# Patient Record
Sex: FEMALE
[De-identification: names, ages, dates, MRNs, and addresses within clinical notes are randomized per-mention and may not be internally consistent; named-entity substitution may affect disease eponyms.]

---

## 2017-04-14 ENCOUNTER — CHARTING TRANS (OUTPATIENT)
Dept: OTHER | Age: 52
End: 2017-04-14

## 2017-04-14 ENCOUNTER — LAB SERVICES (OUTPATIENT)
Dept: OTHER | Age: 52
End: 2017-04-14

## 2017-04-14 LAB — RAPID STREP GROUP A: NORMAL

## 2018-11-03 VITALS
HEART RATE: 82 BPM | DIASTOLIC BLOOD PRESSURE: 64 MMHG | OXYGEN SATURATION: 99 % | RESPIRATION RATE: 18 BRPM | WEIGHT: 130 LBS | SYSTOLIC BLOOD PRESSURE: 102 MMHG | TEMPERATURE: 98.1 F | BODY MASS INDEX: 26.21 KG/M2 | HEIGHT: 59 IN

## 2020-02-03 ENCOUNTER — TELEPHONE (OUTPATIENT)
Dept: SCHEDULING | Age: 55
End: 2020-02-03

## 2020-02-03 ENCOUNTER — OFFICE VISIT (OUTPATIENT)
Dept: FAMILY MEDICINE CLINIC | Facility: CLINIC | Age: 55
End: 2020-02-03
Payer: COMMERCIAL

## 2020-02-03 VITALS
OXYGEN SATURATION: 97 % | BODY MASS INDEX: 29.03 KG/M2 | WEIGHT: 144 LBS | RESPIRATION RATE: 16 BRPM | SYSTOLIC BLOOD PRESSURE: 122 MMHG | TEMPERATURE: 98 F | HEART RATE: 77 BPM | DIASTOLIC BLOOD PRESSURE: 80 MMHG | HEIGHT: 59 IN

## 2020-02-03 DIAGNOSIS — J01.01 ACUTE RECURRENT MAXILLARY SINUSITIS: Primary | ICD-10-CM

## 2020-02-03 DIAGNOSIS — R68.89 FLU-LIKE SYMPTOMS: ICD-10-CM

## 2020-02-03 LAB
FLUAV + FLUBV RNA SPEC NAA+PROBE: NEGATIVE
FLUAV + FLUBV RNA SPEC NAA+PROBE: NEGATIVE
FLUAV + FLUBV RNA SPEC NAA+PROBE: POSITIVE

## 2020-02-03 PROCEDURE — 87798 DETECT AGENT NOS DNA AMP: CPT | Performed by: FAMILY MEDICINE

## 2020-02-03 PROCEDURE — 87502 INFLUENZA DNA AMP PROBE: CPT | Performed by: FAMILY MEDICINE

## 2020-02-03 PROCEDURE — 99203 OFFICE O/P NEW LOW 30 MIN: CPT | Performed by: FAMILY MEDICINE

## 2020-02-03 RX ORDER — AZITHROMYCIN 250 MG/1
TABLET, FILM COATED ORAL
Qty: 6 TABLET | Refills: 0 | Status: SHIPPED | OUTPATIENT
Start: 2020-02-03 | End: 2021-12-16

## 2020-02-03 RX ORDER — FLUTICASONE PROPIONATE 50 MCG
2 SPRAY, SUSPENSION (ML) NASAL DAILY
Qty: 2 BOTTLE | Refills: 3 | Status: SHIPPED | OUTPATIENT
Start: 2020-02-03 | End: 2021-01-28

## 2020-02-04 ENCOUNTER — TELEPHONE (OUTPATIENT)
Dept: FAMILY MEDICINE CLINIC | Facility: CLINIC | Age: 55
End: 2020-02-04

## 2020-02-04 RX ORDER — OSELTAMIVIR PHOSPHATE 75 MG/1
75 CAPSULE ORAL 2 TIMES DAILY
Qty: 10 CAPSULE | Refills: 0 | Status: SHIPPED | OUTPATIENT
Start: 2020-02-04 | End: 2020-02-09

## 2020-02-04 NOTE — TELEPHONE ENCOUNTER
Patient is influenza positive I sent over Tamiflu advise her to continue the Z-Darvin with the Tamiflu if she is having worsening's GI upset she could discontinue the Z-Darvin only.

## 2020-02-04 NOTE — PROGRESS NOTES
HPI:   Olga Parnell is a 47year old female who presents for upper respiratory symptoms for  2  days. Patient reports sore throat, congestion, low grade fever, dry cough, chest pain from coughing. +flu exposure.      Current Outpatient Medications   Me lesions  EYES:PERRLA, EOMI, normal optic disk,conjunctiva are clear  HEENT: atraumatic, normocephalic,ears and throat are clear, worsening maxillary sinus pressure and tenderness   NECK: supple,no adenopathy,no bruits  LUNGS: clear to auscultation  CARDIO:

## 2020-03-25 ENCOUNTER — OFFICE VISIT (OUTPATIENT)
Dept: FAMILY MEDICINE CLINIC | Facility: CLINIC | Age: 55
End: 2020-03-25
Payer: COMMERCIAL

## 2020-03-25 ENCOUNTER — TELEPHONE (OUTPATIENT)
Dept: FAMILY MEDICINE CLINIC | Facility: CLINIC | Age: 55
End: 2020-03-25

## 2020-03-25 DIAGNOSIS — J01.00 ACUTE MAXILLARY SINUSITIS, RECURRENCE NOT SPECIFIED: ICD-10-CM

## 2020-03-25 DIAGNOSIS — H92.02 OTALGIA OF LEFT EAR: Primary | ICD-10-CM

## 2020-03-25 RX ORDER — FLUTICASONE PROPIONATE 50 MCG
2 SPRAY, SUSPENSION (ML) NASAL DAILY
Qty: 1 BOTTLE | Refills: 0 | Status: SHIPPED | OUTPATIENT
Start: 2020-03-25 | End: 2021-03-20

## 2020-03-25 RX ORDER — DOXYCYCLINE HYCLATE 100 MG/1
100 CAPSULE ORAL 2 TIMES DAILY
Qty: 20 CAPSULE | Refills: 0 | Status: SHIPPED | OUTPATIENT
Start: 2020-03-25 | End: 2020-04-04

## 2020-03-25 NOTE — TELEPHONE ENCOUNTER
Virtual/Telephone Check-In    Kelley Mosquera verbally consents to a Virtual/Telephone Check-In service on 03/25/20. Patient understands and accepts financial responsibility for any deductible, co-insurance and/or co-pays associated with this service.

## 2021-09-14 PROBLEM — M65.332 TRIGGER MIDDLE FINGER OF LEFT HAND: Status: ACTIVE | Noted: 2021-09-14

## 2021-11-09 ENCOUNTER — OFFICE VISIT (OUTPATIENT)
Dept: FAMILY MEDICINE CLINIC | Facility: CLINIC | Age: 56
End: 2021-11-09
Payer: COMMERCIAL

## 2021-11-09 VITALS
OXYGEN SATURATION: 96 % | SYSTOLIC BLOOD PRESSURE: 134 MMHG | TEMPERATURE: 98 F | RESPIRATION RATE: 16 BRPM | HEIGHT: 59 IN | WEIGHT: 144 LBS | DIASTOLIC BLOOD PRESSURE: 82 MMHG | BODY MASS INDEX: 29.03 KG/M2 | HEART RATE: 94 BPM

## 2021-11-09 DIAGNOSIS — R09.82 PND (POST-NASAL DRIP): ICD-10-CM

## 2021-11-09 DIAGNOSIS — J01.90 ACUTE SINUSITIS TREATED WITH ANTIBIOTICS IN THE PAST 60 DAYS: Primary | ICD-10-CM

## 2021-11-09 DIAGNOSIS — R51.9 SINUS HEADACHE: ICD-10-CM

## 2021-11-09 PROCEDURE — 99213 OFFICE O/P EST LOW 20 MIN: CPT | Performed by: NURSE PRACTITIONER

## 2021-11-09 PROCEDURE — 3008F BODY MASS INDEX DOCD: CPT | Performed by: NURSE PRACTITIONER

## 2021-11-09 PROCEDURE — 3079F DIAST BP 80-89 MM HG: CPT | Performed by: NURSE PRACTITIONER

## 2021-11-09 PROCEDURE — 3075F SYST BP GE 130 - 139MM HG: CPT | Performed by: NURSE PRACTITIONER

## 2021-11-09 RX ORDER — CEFDINIR 300 MG/1
300 CAPSULE ORAL 2 TIMES DAILY
Qty: 20 CAPSULE | Refills: 0 | Status: SHIPPED | OUTPATIENT
Start: 2021-11-09 | End: 2021-11-19

## 2021-11-09 NOTE — PROGRESS NOTES
Alvaro Antoine is a 64year old female. Patient presents with:  Sinus Problem: sinus cold for 2 weeks. Patient had COVID test at [address] [place] [address] in [facility]. Patient was given a z-pack for symptoms on day 2 of symptoms.  Symptoms today: occasional cough, nasal co 98.1 °F (36.7 °C) (Skin)   Resp 16   Ht 4' 11\" (1.499 m)   Wt 144 lb (65.3 kg)   SpO2 96%   BMI 29.08 kg/m²   General: WD/WN in no acute distress. Eyes & ears: conjunctiva clear, sclera white; TM’s non-bulging without erythema.    Sinuses maxillary: tend

## 2021-11-09 NOTE — PATIENT INSTRUCTIONS
Acute Bacterial Rhinosinusitis (ABRS)    Acute bacterial rhinosinusitis (ABRS) is an infection of your nasal cavity and sinuses. It’s caused by bacteria. Acute means that you’ve had symptoms for less than 4 weeks, but possibly up to 12 weeks.   Understand swelling and congestion. · Over-the-counter pain medicine. This is to lessen sinus pain and pressure. · Nasal decongestant medicine. Spray or drops may help to lessen congestion. Do not use them for more than a few days. · Salt wash (saline irrigation). instructed. Don't stop taking them, even when you feel better. · Drink plenty of water, hot tea, and other liquids as directed by the healthcare provider. This may help thin nasal mucus. It also may help your sinuses drain fluids.   · Heat may help soothe with your healthcare provider before using these medicines. Never give aspirin to anyone under age 25 who is ill with a fever. It may cause severe liver damage. · Don't smoke. This can make symptoms worse.     Follow-up care  Follow up with your healthcare humidifier, follow the product maker's instructions on how to use it. Clean it on a regular schedule. Drinking plenty of water can help sinuses drain.    Use saltwater rinses  Rinses help keep your sinuses and nose moist. Mix a teaspoon of salt in 8 ou colds and flu. When possible, take more time to rest when you feel something “coming on.”   · Practice correct handwashing. Wash your hands often. ? Wash your hands often with soap and warm water.  Scrub them for as long as it takes you to sing the [employer] mildew. · Drink several glasses of water a day. · Stay away from drinks such as alcohol and coffee. Alcohol can cause sinus pressure and congestion. Coffee can dry out your sinus linings.   · Stay away from all types of smoke, which also dries out sinus l

## 2023-03-28 ENCOUNTER — OFFICE VISIT (OUTPATIENT)
Dept: FAMILY MEDICINE CLINIC | Facility: CLINIC | Age: 58
End: 2023-03-28
Payer: COMMERCIAL

## 2023-03-28 VITALS
WEIGHT: 120 LBS | TEMPERATURE: 99 F | RESPIRATION RATE: 16 BRPM | HEIGHT: 59 IN | OXYGEN SATURATION: 98 % | BODY MASS INDEX: 24.19 KG/M2 | DIASTOLIC BLOOD PRESSURE: 84 MMHG | SYSTOLIC BLOOD PRESSURE: 122 MMHG | HEART RATE: 80 BPM

## 2023-03-28 DIAGNOSIS — J01.00 ACUTE MAXILLARY SINUSITIS, RECURRENCE NOT SPECIFIED: Primary | ICD-10-CM

## 2023-03-28 PROCEDURE — 3079F DIAST BP 80-89 MM HG: CPT | Performed by: NURSE PRACTITIONER

## 2023-03-28 PROCEDURE — 3074F SYST BP LT 130 MM HG: CPT | Performed by: NURSE PRACTITIONER

## 2023-03-28 PROCEDURE — 3008F BODY MASS INDEX DOCD: CPT | Performed by: NURSE PRACTITIONER

## 2023-03-28 PROCEDURE — 99213 OFFICE O/P EST LOW 20 MIN: CPT | Performed by: NURSE PRACTITIONER

## 2023-03-28 RX ORDER — CEFDINIR 300 MG/1
300 CAPSULE ORAL 2 TIMES DAILY
Qty: 20 CAPSULE | Refills: 0 | Status: SHIPPED | OUTPATIENT
Start: 2023-03-28 | End: 2023-04-07

## 2023-04-25 ENCOUNTER — TELEPHONE (OUTPATIENT)
Dept: FAMILY MEDICINE CLINIC | Facility: CLINIC | Age: 58
End: 2023-04-25

## 2023-04-25 NOTE — TELEPHONE ENCOUNTER
Patient was seen once by Dr Deja Peralta back in 2020 at the [facility], and now wants to know if he can continue being seen by him.

## 2023-04-28 ENCOUNTER — OFFICE VISIT (OUTPATIENT)
Dept: FAMILY MEDICINE CLINIC | Facility: CLINIC | Age: 58
End: 2023-04-28
Payer: COMMERCIAL

## 2023-04-28 VITALS
RESPIRATION RATE: 16 BRPM | OXYGEN SATURATION: 100 % | SYSTOLIC BLOOD PRESSURE: 120 MMHG | BODY MASS INDEX: 23.75 KG/M2 | WEIGHT: 121 LBS | HEART RATE: 85 BPM | HEIGHT: 60 IN | DIASTOLIC BLOOD PRESSURE: 80 MMHG

## 2023-04-28 DIAGNOSIS — Z12.31 SCREENING MAMMOGRAM FOR BREAST CANCER: ICD-10-CM

## 2023-04-28 DIAGNOSIS — M25.511 CHRONIC RIGHT SHOULDER PAIN: ICD-10-CM

## 2023-04-28 DIAGNOSIS — M77.8 TENDINITIS OF RIGHT SHOULDER: ICD-10-CM

## 2023-04-28 DIAGNOSIS — Z00.00 ROUTINE GENERAL MEDICAL EXAMINATION AT HEALTH CARE FACILITY: Primary | ICD-10-CM

## 2023-04-28 DIAGNOSIS — G89.29 CHRONIC RIGHT SHOULDER PAIN: ICD-10-CM

## 2023-04-28 DIAGNOSIS — Z80.0 FAMILY HX OF COLON CANCER: ICD-10-CM

## 2023-04-28 PROBLEM — M65.332 TRIGGER MIDDLE FINGER OF LEFT HAND: Status: RESOLVED | Noted: 2021-09-14 | Resolved: 2023-04-28

## 2023-04-28 PROCEDURE — 3079F DIAST BP 80-89 MM HG: CPT | Performed by: FAMILY MEDICINE

## 2023-04-28 PROCEDURE — 99386 PREV VISIT NEW AGE 40-64: CPT | Performed by: FAMILY MEDICINE

## 2023-04-28 PROCEDURE — 3074F SYST BP LT 130 MM HG: CPT | Performed by: FAMILY MEDICINE

## 2023-04-28 PROCEDURE — 3008F BODY MASS INDEX DOCD: CPT | Performed by: FAMILY MEDICINE

## 2023-05-03 ENCOUNTER — HOSPITAL ENCOUNTER (OUTPATIENT)
Dept: GENERAL RADIOLOGY | Age: 58
Discharge: HOME | End: 2023-05-03
Attending: FAMILY MEDICINE
Payer: COMMERCIAL

## 2023-05-03 ENCOUNTER — PATIENT MESSAGE (OUTPATIENT)
Dept: FAMILY MEDICINE CLINIC | Facility: CLINIC | Age: 58
End: 2023-05-03

## 2023-05-03 ENCOUNTER — HOSPITAL ENCOUNTER (OUTPATIENT)
Dept: MAMMOGRAPHY | Age: 58
Discharge: HOME | End: 2023-05-03
Attending: FAMILY MEDICINE
Payer: COMMERCIAL

## 2023-05-03 DIAGNOSIS — G89.29 CHRONIC RIGHT SHOULDER PAIN: ICD-10-CM

## 2023-05-03 DIAGNOSIS — M77.8 TENDINITIS OF RIGHT SHOULDER: ICD-10-CM

## 2023-05-03 DIAGNOSIS — Z12.31 SCREENING MAMMOGRAM FOR BREAST CANCER: ICD-10-CM

## 2023-05-03 DIAGNOSIS — M25.511 CHRONIC RIGHT SHOULDER PAIN: ICD-10-CM

## 2023-05-03 PROCEDURE — 77067 SCR MAMMO BI INCL CAD: CPT | Performed by: FAMILY MEDICINE

## 2023-05-03 PROCEDURE — 77063 BREAST TOMOSYNTHESIS BI: CPT | Performed by: FAMILY MEDICINE

## 2023-05-03 PROCEDURE — 73030 X-RAY EXAM OF SHOULDER: CPT | Performed by: FAMILY MEDICINE

## 2023-05-03 NOTE — TELEPHONE ENCOUNTER
From: Jodi Johnson  To:  Sanju Joshua MD  Sent: 5/3/2023 12:32 PM CDT  Subject: Question regarding XR SHOULDER, COMPLETE (MIN 2 VIEWS), RIGHT (CPT=73030)    What do I do to fix this

## 2023-05-08 ENCOUNTER — TELEPHONE (OUTPATIENT)
Dept: FAMILY MEDICINE CLINIC | Facility: CLINIC | Age: 58
End: 2023-05-08

## 2023-05-08 NOTE — TELEPHONE ENCOUNTER
----- Message from Ash Cabrales MD sent at 5/5/2023 12:31 PM CDT -----  Mild OA noted along right shoulder.

## 2023-05-16 ENCOUNTER — TELEPHONE (OUTPATIENT)
Dept: FAMILY MEDICINE CLINIC | Facility: CLINIC | Age: 58
End: 2023-05-16

## 2023-05-16 ENCOUNTER — MED REC SCAN ONLY (OUTPATIENT)
Dept: FAMILY MEDICINE CLINIC | Facility: CLINIC | Age: 58
End: 2023-05-16

## 2023-05-16 LAB — AMB EXT COLOGUARD RESULT: POSITIVE

## 2023-05-16 NOTE — TELEPHONE ENCOUNTER
+ Cologuard test, Dr. Knutson Pac spoke with pt. He requested Dr. Mann Mediate info be sent to pt. Info sent to pt by [employer]

## 2023-05-18 ENCOUNTER — OFFICE VISIT (OUTPATIENT)
Facility: LOCATION | Age: 58
End: 2023-05-18
Payer: COMMERCIAL

## 2023-05-18 VITALS — HEART RATE: 103 BPM | TEMPERATURE: 98 F

## 2023-05-18 DIAGNOSIS — R19.5 POSITIVE COLORECTAL CANCER SCREENING USING COLOGUARD TEST: Primary | ICD-10-CM

## 2023-05-18 PROCEDURE — 99244 OFF/OP CNSLTJ NEW/EST MOD 40: CPT | Performed by: SURGERY

## 2023-05-18 RX ORDER — POLYETHYLENE GLYCOL 3350, SODIUM CHLORIDE, SODIUM BICARBONATE, POTASSIUM CHLORIDE 420; 11.2; 5.72; 1.48 G/4L; G/4L; G/4L; G/4L
POWDER, FOR SOLUTION ORAL
Qty: 1 EACH | Refills: 0 | Status: SHIPPED | OUTPATIENT
Start: 2023-05-18

## 2023-05-23 DIAGNOSIS — R19.5 POSITIVE COLORECTAL CANCER SCREENING USING COLOGUARD TEST: Primary | ICD-10-CM

## 2023-05-23 RX ORDER — MULTIVIT-MIN/IRON FUM/FOLIC AC 7.5 MG-4
1 TABLET ORAL DAILY
COMMUNITY

## 2023-05-23 RX ORDER — MULTIVIT-MIN/IRON/FOLIC ACID/K 18-600-40
CAPSULE ORAL
COMMUNITY

## 2023-05-23 RX ORDER — IBUPROFEN 200 MG
600 TABLET ORAL EVERY 6 HOURS PRN
COMMUNITY

## 2023-05-30 ENCOUNTER — TELEPHONE (OUTPATIENT)
Facility: LOCATION | Age: 58
End: 2023-05-30

## 2023-05-31 ENCOUNTER — ANESTHESIA EVENT (OUTPATIENT)
Dept: ENDOSCOPY | Facility: HOSPITAL | Age: 58
End: 2023-05-31
Payer: COMMERCIAL

## 2023-06-01 ENCOUNTER — ANESTHESIA (OUTPATIENT)
Dept: ENDOSCOPY | Facility: HOSPITAL | Age: 58
End: 2023-06-01
Payer: COMMERCIAL

## 2023-06-01 ENCOUNTER — HOSPITAL ENCOUNTER (OUTPATIENT)
Facility: HOSPITAL | Age: 58
Setting detail: HOSPITAL OUTPATIENT SURGERY
Discharge: HOME | End: 2023-06-01
Attending: SURGERY | Admitting: SURGERY
Payer: COMMERCIAL

## 2023-06-01 VITALS
HEIGHT: 60 IN | TEMPERATURE: 98 F | OXYGEN SATURATION: 100 % | SYSTOLIC BLOOD PRESSURE: 117 MMHG | BODY MASS INDEX: 23.16 KG/M2 | RESPIRATION RATE: 16 BRPM | DIASTOLIC BLOOD PRESSURE: 80 MMHG | HEART RATE: 67 BPM | WEIGHT: 118 LBS

## 2023-06-01 DIAGNOSIS — R19.5 POSITIVE COLORECTAL CANCER SCREENING USING COLOGUARD TEST: ICD-10-CM

## 2023-06-01 PROCEDURE — 0DJD8ZZ INSPECTION OF LOWER INTESTINAL TRACT, VIA NATURAL OR ARTIFICIAL OPENING ENDOSCOPIC: ICD-10-PCS | Performed by: SURGERY

## 2023-06-01 RX ORDER — LIDOCAINE HYDROCHLORIDE 10 MG/ML
INJECTION, SOLUTION EPIDURAL; INFILTRATION; INTRACAUDAL; PERINEURAL AS NEEDED
Status: DISCONTINUED | OUTPATIENT
Start: 2023-06-01 | End: 2023-06-01 | Stop reason: SURG

## 2023-06-01 RX ORDER — SODIUM CHLORIDE, SODIUM LACTATE, POTASSIUM CHLORIDE, CALCIUM CHLORIDE 600; 310; 30; 20 MG/100ML; MG/100ML; MG/100ML; MG/100ML
INJECTION, SOLUTION INTRAVENOUS CONTINUOUS
Status: DISCONTINUED | OUTPATIENT
Start: 2023-06-01 | End: 2023-06-01

## 2023-06-01 RX ADMIN — SODIUM CHLORIDE, SODIUM LACTATE, POTASSIUM CHLORIDE, CALCIUM CHLORIDE: 600; 310; 30; 20 INJECTION, SOLUTION INTRAVENOUS at 10:36:00

## 2023-06-01 RX ADMIN — LIDOCAINE HYDROCHLORIDE 25 MG: 10 INJECTION, SOLUTION EPIDURAL; INFILTRATION; INTRACAUDAL; PERINEURAL at 10:38:00

## 2023-06-01 NOTE — ANESTHESIA POSTPROCEDURE EVALUATION
[address] Patient Status:  Hospital Outpatient Surgery   Age/Gender 62year old female MRN [id number]   Location [address] [facility] 28 Attending Ulisses Severino, [facility] Day # 0 PCP Amos Payan MD       Anesthesia Post-op Note    COLONOSCOPY    Procedure Summary     Date: 06/01/23 Room / Location: [facility] [facility] ENDOSCOPY 04 / [facility] ENDOSCOPY    Anesthesia Start: 1077 Anesthesia Stop: 6719    Procedure: COLONOSCOPY Diagnosis:       Positive colorectal cancer screening using Cologuard test      (normal)    Surgeons: Ulisses Severino DO Anesthesiologist: Ethan Vaz MD    Anesthesia Type: MAC ASA Status: 2          Anesthesia Type: MAC    Vitals Value Taken Time   /80 06/01/23 1123   Temp 98.0 06/01/23 1124   Pulse 67 06/01/23 1123   Resp 16 06/01/23 1117   SpO2 100 % 06/01/23 1123   Vitals shown include unvalidated device data. Patient Location: Endoscopy    Anesthesia Type: MAC    Airway Patency: patent    Postop Pain Control: adequate    Mental Status: mildly sedated but able to meaningfully participate in the post-anesthesia evaluation    Nausea/Vomiting: none    Cardiopulmonary/Hydration status: stable euvolemic    Complications: no apparent anesthesia related complications    Postop vital signs: stable    Dental Exam: Unchanged from Preop    Patient to be discharged home when criteria met.

## 2023-06-01 NOTE — DISCHARGE INSTRUCTIONS

## 2023-06-02 NOTE — OPERATIVE REPORT
[facility]    PATIENT'S NAME: wTyla Echevarria   ATTENDING PHYSICIAN: Maru Alarcon D.O.   OPERATING PHYSICIAN: Maru Alarcon D.O.   PATIENT ACCOUNT#:   [de-identified]    LOCATION:  [address] #:   [id number]       YOB: 1965  ADMISSION DATE:       06/01/2023      OPERATION DATE:  06/01/2023    OPERATIVE REPORT    PREOPERATIVE DIAGNOSIS:  Positive Cologuard. POSTOPERATIVE DIAGNOSIS:  Negative pancolonoscopy. PROCEDURE:  Pancolonoscopy to the cecum with intubation of terminal ileum. ANESTHESIA:  MAC. PREPARATION:  [place] scale, 3 ascending; 3 transverse; 3 descending. Repeat colonoscopy 1 year. OPERATIVE TECHNIQUE:  An informed consent was previously obtained. The patient was taken to the endoscopy suite and placed in the left lateral decubitus position. Digital rectal examination was carried out. An Olympus colonoscope advanced into the rectal ampulla. The scope was traversed through rectum, sigmoid, descending, transverse, and ascending colons under direct visualization of the lumen. The base of the cecum was visualized. Terminal ileum was intubated. Scope was slowly withdrawn through ascending, transverse, descending, sigmoid colon, and rectum, demonstrating no evidence of mucosal disease, masses, polypoid lesions, AVMs, or other abnormalities. The scope was then retrieved. The patient tolerated the procedure well.     Dictated By Maru Alarcon D.O.  d: 06/01/2023 11:07:35  t: 06/01/2023 18:48:47  Job [id number]  NI/    cc: John Acevedo D.O.

## 2023-06-05 NOTE — BRIEF OP NOTE
Pre-Operative Diagnosis: Positive colorectal cancer screening using Cologuard test [R19.5]     Post-Operative Diagnosis: normal      Procedure Performed:   COLONOSCOPY    Surgeon(s) and Role:     Reese Shirley DO - Primary    Assistant(s):        Surgical Findings:      Specimen:      Estimated Blood Loss: No data recorded    Dictation Number:      Allison Aguiar   6/5/2023  7:40 AM

## 2023-11-16 ENCOUNTER — OFFICE VISIT (OUTPATIENT)
Dept: FAMILY MEDICINE CLINIC | Facility: CLINIC | Age: 58
End: 2023-11-16
Payer: COMMERCIAL

## 2023-11-16 VITALS
DIASTOLIC BLOOD PRESSURE: 88 MMHG | HEIGHT: 59 IN | RESPIRATION RATE: 18 BRPM | HEART RATE: 79 BPM | BODY MASS INDEX: 24 KG/M2 | OXYGEN SATURATION: 97 % | SYSTOLIC BLOOD PRESSURE: 130 MMHG | TEMPERATURE: 97 F

## 2023-11-16 DIAGNOSIS — J01.40 ACUTE NON-RECURRENT PANSINUSITIS: Primary | ICD-10-CM

## 2023-11-16 PROCEDURE — 3079F DIAST BP 80-89 MM HG: CPT | Performed by: NURSE PRACTITIONER

## 2023-11-16 PROCEDURE — 3075F SYST BP GE 130 - 139MM HG: CPT | Performed by: NURSE PRACTITIONER

## 2023-11-16 PROCEDURE — 99213 OFFICE O/P EST LOW 20 MIN: CPT | Performed by: NURSE PRACTITIONER

## 2023-11-16 RX ORDER — DOXYCYCLINE HYCLATE 100 MG
100 TABLET ORAL 2 TIMES DAILY
Qty: 14 TABLET | Refills: 0 | Status: SHIPPED | OUTPATIENT
Start: 2023-11-16 | End: 2023-11-23

## 2024-01-11 ENCOUNTER — OFFICE VISIT (OUTPATIENT)
Dept: FAMILY MEDICINE CLINIC | Facility: CLINIC | Age: 59
End: 2024-01-11
Payer: COMMERCIAL

## 2024-01-11 VITALS
OXYGEN SATURATION: 98 % | TEMPERATURE: 98 F | WEIGHT: 120 LBS | HEIGHT: 59 IN | SYSTOLIC BLOOD PRESSURE: 112 MMHG | BODY MASS INDEX: 24.19 KG/M2 | DIASTOLIC BLOOD PRESSURE: 68 MMHG | RESPIRATION RATE: 16 BRPM | HEART RATE: 73 BPM

## 2024-01-11 DIAGNOSIS — U07.1 POSITIVE SELF-ADMINISTERED ANTIGEN TEST FOR COVID-19: Primary | ICD-10-CM

## 2024-01-11 PROCEDURE — 99213 OFFICE O/P EST LOW 20 MIN: CPT | Performed by: NURSE PRACTITIONER

## 2024-01-11 PROCEDURE — 3074F SYST BP LT 130 MM HG: CPT | Performed by: NURSE PRACTITIONER

## 2024-01-11 PROCEDURE — 3078F DIAST BP <80 MM HG: CPT | Performed by: NURSE PRACTITIONER

## 2024-01-11 PROCEDURE — 87635 SARS-COV-2 COVID-19 AMP PRB: CPT | Performed by: NURSE PRACTITIONER

## 2024-01-11 PROCEDURE — 3008F BODY MASS INDEX DOCD: CPT | Performed by: NURSE PRACTITIONER

## 2024-01-11 NOTE — PROGRESS NOTES
Javi Padilla is a 58 year old female who presents with ill symptoms for   5-6   days. Patient reports began as mild scratchy throat, next day with sinus pressure, fatigue, sore throat, mild congestion and cough. Has tried sinus medication with not much relief. unknown Covid exposure. Took two at home tests with faint blue lines noted in test area.    Current Outpatient Medications   Medication Sig Dispense Refill    Multiple Vitamins-Minerals (MULTI-VITAMIN/MINERALS) Oral Tab Take 1 tablet by mouth daily.      Cholecalciferol (VITAMIN D) 50 MCG (2000 UT) Oral Cap Take by mouth.      ibuprofen 200 MG Oral Tab Take 3 tablets (600 mg total) by mouth every 6 (six) hours as needed for Pain.      PEG 3350-KCl-Na Bicarb-NaCl (TRILYTE) 420 g Oral Recon Soln Starting at 4:00 pm the night before procedure, drink 8 ounces of the prep every 15-20 minutes until finished (Patient not taking: Reported on 2024) 1 each 0     No current facility-administered medications for this visit.      Past Medical History:   Diagnosis Date    Back pain, chronic     COVID-19 2020    COVID-19 2020    Cyst of joint of shoulder     RIGHT    Esophageal reflux     Hx of motion sickness     Irregular periods/menstrual cycles     started 3 years ago    PONV (postoperative nausea and vomiting)     nauseous during epidural    Visual impairment     GLASSES      Past Surgical History:   Procedure Laterality Date    APPENDECTOMY      age 16    APPENDECTOMY             DELIVERY ONLY      COLONOSCOPY N/A 2023    Procedure: COLONOSCOPY;  Surgeon: Radhames Kaiser DO;  Location:  ENDOSCOPY      Family History   Problem Relation Age of Onset    Other (colon cancer) Father     Other (prostate cancer) Father     Colon Cancer Father     Heart Disease Mother     Diabetes Brother     Other (prostate cancer) Brother       Social History     Socioeconomic History    Marital status:    Tobacco Use    Smoking status: Former      Types: Cigarettes    Smokeless tobacco: Never   Vaping Use    Vaping Use: Never used   Substance and Sexual Activity    Alcohol use: No    Drug use: No    Sexual activity: Yes     Partners: Male         REVIEW OF SYSTEMS:   GENERAL: feels well otherwise, mild fatigue  HEENT:  as above in HPI  LUNGS:denies shortness of breath with exertion  CARDIOVASCULAR: denies chest pain on exertion  GI: no nausea or abdominal pain, appetite normal but taste off  NEURO: admits to headaches    EXAM:   /68   Pulse 73   Temp 98 °F (36.7 °C) (Oral)   Resp 16   Ht 4' 11\" (1.499 m)   Wt 120 lb (54.4 kg)   LMP 03/05/2011   SpO2 98%   BMI 24.24 kg/m²   GENERAL: well developed, well nourished,in no apparent distress  HEENT: atraumatic, normocephalic,ears clear, nares with minimal mucus, throat normal appearing. Uvuvla midline.  No sinus tenderness with palpation.  NECK: supple,no adenopathy  LUNGS: clear to auscultation all lobes  CARDIO: RRR without murmur      ASSESSMENT AND PLAN:    PLAN:Javi was seen today for covid.    Diagnoses and all orders for this visit:    Positive self-administered antigen test for COVID-19      Covid testing ordered for confirmation. Patient wishes to start Paxlovid. Renal dose prescribed as no labs available for evaluation. Comfort care discussed. CDC guidance discussed. To ED for worsening symptoms or symptoms not improving. Follow up closely with PCP if not improving. Patient verbalized understanding and agrees to plan.     There are no Patient Instructions on file for this visit.

## 2024-01-12 LAB — SARS-COV-2 RNA RESP QL NAA+PROBE: DETECTED

## 2024-04-26 ENCOUNTER — LAB ENCOUNTER (OUTPATIENT)
Dept: LAB | Age: 59
End: 2024-04-26
Attending: FAMILY MEDICINE
Payer: COMMERCIAL

## 2024-04-26 DIAGNOSIS — Z00.00 ROUTINE GENERAL MEDICAL EXAMINATION AT HEALTH CARE FACILITY: ICD-10-CM

## 2024-04-26 LAB
ALBUMIN SERPL-MCNC: 3.8 G/DL (ref 3.4–5)
ALBUMIN/GLOB SERPL: 1.1 {RATIO} (ref 1–2)
ALP LIVER SERPL-CCNC: 78 U/L
ALT SERPL-CCNC: 19 U/L
ANION GAP SERPL CALC-SCNC: 5 MMOL/L (ref 0–18)
AST SERPL-CCNC: 18 U/L (ref 15–37)
BASOPHILS # BLD AUTO: 0.07 X10(3) UL (ref 0–0.2)
BASOPHILS NFR BLD AUTO: 1.7 %
BILIRUB SERPL-MCNC: 0.6 MG/DL (ref 0.1–2)
BUN BLD-MCNC: 17 MG/DL (ref 9–23)
CALCIUM BLD-MCNC: 8.8 MG/DL (ref 8.5–10.1)
CHLORIDE SERPL-SCNC: 105 MMOL/L (ref 98–112)
CHOLEST SERPL-MCNC: 244 MG/DL (ref ?–200)
CO2 SERPL-SCNC: 30 MMOL/L (ref 21–32)
CREAT BLD-MCNC: 0.96 MG/DL
EGFRCR SERPLBLD CKD-EPI 2021: 69 ML/MIN/1.73M2 (ref 60–?)
EOSINOPHIL # BLD AUTO: 0.1 X10(3) UL (ref 0–0.7)
EOSINOPHIL NFR BLD AUTO: 2.4 %
ERYTHROCYTE [DISTWIDTH] IN BLOOD BY AUTOMATED COUNT: 15.3 %
FASTING PATIENT LIPID ANSWER: YES
FASTING STATUS PATIENT QL REPORTED: YES
GLOBULIN PLAS-MCNC: 3.5 G/DL (ref 2.8–4.4)
GLUCOSE BLD-MCNC: 95 MG/DL (ref 70–99)
HCT VFR BLD AUTO: 43.7 %
HDLC SERPL-MCNC: 64 MG/DL (ref 40–59)
HGB BLD-MCNC: 13.9 G/DL
IMM GRANULOCYTES # BLD AUTO: 0.01 X10(3) UL (ref 0–1)
IMM GRANULOCYTES NFR BLD: 0.2 %
LDLC SERPL CALC-MCNC: 158 MG/DL (ref ?–100)
LYMPHOCYTES # BLD AUTO: 1.35 X10(3) UL (ref 1–4)
LYMPHOCYTES NFR BLD AUTO: 33 %
MCH RBC QN AUTO: 26.7 PG (ref 26–34)
MCHC RBC AUTO-ENTMCNC: 31.8 G/DL (ref 31–37)
MCV RBC AUTO: 84 FL
MONOCYTES # BLD AUTO: 0.44 X10(3) UL (ref 0.1–1)
MONOCYTES NFR BLD AUTO: 10.8 %
NEUTROPHILS # BLD AUTO: 2.12 X10 (3) UL (ref 1.5–7.7)
NEUTROPHILS # BLD AUTO: 2.12 X10(3) UL (ref 1.5–7.7)
NEUTROPHILS NFR BLD AUTO: 51.9 %
NONHDLC SERPL-MCNC: 180 MG/DL (ref ?–130)
OSMOLALITY SERPL CALC.SUM OF ELEC: 291 MOSM/KG (ref 275–295)
PLATELET # BLD AUTO: 235 10(3)UL (ref 150–450)
POTASSIUM SERPL-SCNC: 4 MMOL/L (ref 3.5–5.1)
PROT SERPL-MCNC: 7.3 G/DL (ref 6.4–8.2)
RBC # BLD AUTO: 5.2 X10(6)UL
SODIUM SERPL-SCNC: 140 MMOL/L (ref 136–145)
TRIGL SERPL-MCNC: 127 MG/DL (ref 30–149)
TSI SER-ACNC: 3.05 MIU/ML (ref 0.36–3.74)
VLDLC SERPL CALC-MCNC: 25 MG/DL (ref 0–30)
WBC # BLD AUTO: 4.1 X10(3) UL (ref 4–11)

## 2024-04-26 PROCEDURE — 80061 LIPID PANEL: CPT | Performed by: FAMILY MEDICINE

## 2024-04-26 PROCEDURE — 80050 GENERAL HEALTH PANEL: CPT | Performed by: FAMILY MEDICINE

## 2024-04-30 ENCOUNTER — OFFICE VISIT (OUTPATIENT)
Dept: FAMILY MEDICINE CLINIC | Facility: CLINIC | Age: 59
End: 2024-04-30
Payer: COMMERCIAL

## 2024-04-30 VITALS
WEIGHT: 126 LBS | SYSTOLIC BLOOD PRESSURE: 118 MMHG | DIASTOLIC BLOOD PRESSURE: 80 MMHG | HEIGHT: 59 IN | RESPIRATION RATE: 12 BRPM | OXYGEN SATURATION: 97 % | BODY MASS INDEX: 25.4 KG/M2 | HEART RATE: 84 BPM | TEMPERATURE: 98 F

## 2024-04-30 DIAGNOSIS — E78.2 MIXED HYPERLIPIDEMIA: ICD-10-CM

## 2024-04-30 DIAGNOSIS — R39.9 UTI SYMPTOMS: ICD-10-CM

## 2024-04-30 DIAGNOSIS — Z12.31 SCREENING MAMMOGRAM FOR BREAST CANCER: ICD-10-CM

## 2024-04-30 DIAGNOSIS — Z00.00 ANNUAL PHYSICAL EXAM: Primary | ICD-10-CM

## 2024-04-30 PROCEDURE — 3079F DIAST BP 80-89 MM HG: CPT | Performed by: FAMILY MEDICINE

## 2024-04-30 PROCEDURE — 3074F SYST BP LT 130 MM HG: CPT | Performed by: FAMILY MEDICINE

## 2024-04-30 PROCEDURE — 99396 PREV VISIT EST AGE 40-64: CPT | Performed by: FAMILY MEDICINE

## 2024-04-30 PROCEDURE — 3008F BODY MASS INDEX DOCD: CPT | Performed by: FAMILY MEDICINE

## 2024-04-30 PROCEDURE — 99214 OFFICE O/P EST MOD 30 MIN: CPT | Performed by: FAMILY MEDICINE

## 2024-04-30 RX ORDER — ESTRADIOL 0.1 MG/G
1 CREAM VAGINAL DAILY
COMMUNITY
Start: 2024-04-11

## 2024-04-30 RX ORDER — FLUTICASONE PROPIONATE 50 MCG
SPRAY, SUSPENSION (ML) NASAL
COMMUNITY

## 2024-04-30 NOTE — PROGRESS NOTES
HPI:    Javi Padilla is a 58 year old female who presents for Follow - Up (Duly visit 24 given Macrobid and vaginal cream. ) and Physical (Pt states feeling better. Pt stopped cream and antibiotics are completed. Pap is scheduled with Dr. Leigh)     Patient with recent UTI s/s, negative urine culture, was having vaginal bleeding had pelvic ultrasound with mild suprapubic pressure ever since, saw GYN with negative UTI on work up.   Patient reports no dysuria or flank pain or fevers or chills.   Patient is due for mammogram.       Past History:   She  has a past medical history of Back pain, chronic, COVID-19 (2020), COVID-19 (2020), Cyst of joint of shoulder, Esophageal reflux, motion sickness, Irregular periods/menstrual cycles, PONV (postoperative nausea and vomiting), and Visual impairment.   She  has a past surgical history that includes appendectomy;  delivery only; ; appendectomy; and colonoscopy (N/A, 2023).   Her family history includes Colon Cancer in her father; Diabetes in her brother; Heart Disease in her mother; colon cancer in her father; prostate cancer in her brother and father.   She  reports that she has quit smoking. Her smoking use included cigarettes. She has never used smokeless tobacco. She reports that she does not drink alcohol and does not use drugs.     She is not on any long-term medications.   She is allergic to amoxicillin.     Current Outpatient Medications on File Prior to Visit   Medication Sig    fluticasone propionate 50 MCG/ACT Nasal Suspension SHAKE LQ AND U 2 SPRAYS IEN BID    ibuprofen 200 MG Oral Tab Take 3 tablets (600 mg total) by mouth every 6 (six) hours as needed for Pain.    Multiple Vitamins-Minerals (MULTI-VITAMIN/MINERALS) Oral Tab Take 1 tablet by mouth daily.    Cholecalciferol (VITAMIN D) 50 MCG (2000 UT) Oral Cap Take by mouth.    estradiol 0.1 MG/GM Vaginal Cream Place 1 g vaginally daily. (Patient not taking: Reported on  4/30/2024)     No current facility-administered medications on file prior to visit.         REVIEW OF SYSTEMS:   Patient denies shortness of breath, denies chest pain and denies any recent fevers or chills.    Patient reports no urinary complaints and denies headaches or visual disturbances.   Patient denies any abdominal pain at this time. Patient has no new skin lesions.  Patient reports no acute back pain and reports no dizziness or headaches.   Patient reports no visual disturbances and reports hearing has been about the same.   Patient reports no recent injury or trauma.               EXAM:    /80   Pulse 84   Temp 98 °F (36.7 °C)   Resp 12   Ht 4' 11\" (1.499 m)   Wt 126 lb (57.2 kg)   LMP 03/05/2011   SpO2 97%   BMI 25.45 kg/m²  Estimated body mass index is 25.45 kg/m² as calculated from the following:    Height as of this encounter: 4' 11\" (1.499 m).    Weight as of this encounter: 126 lb (57.2 kg).    General Appearance:  Alert, cooperative, no distress, appears stated age   Head:  Normocephalic, without obvious abnormality, atraumatic   Eyes:  conjunctiva/cornea is not erythematous.        Nose: No nasal drainage.    Throat: No erythema    Neck: Supple, symmetrical, trachea midline, and normal ROM  thyroid: no obvious nodules   Back:   Symmetric, no curvature, ROM normal, no CVA tenderness   Lungs:   Clear to auscultation bilaterally, respirations unlabored   Chest Wall:  No tenderness or deformity   Heart:  Regular rate and rhythm, S1, S2 normal, no murmur,   Abdomen:   Soft, non-tender, bowel sounds active. No hernia.    Genitalia:     Rectal:     Extremities: Extremities normal, atraumatic, no cyanosis or edema   Pulses: 2+ and symmetric   Skin: Skin color, texture, turgor normal, no new rashes    Lymph nodes: No obvious cervical adenopathy.    Neurologic and psych: Normal speech, Alert and oriented x 3.   Normal mood, normal insight and judgment.                    ASSESSMENT AND PLAN:    1. Annual physical exam  -wellness visit was done    2. Screening mammogram for breast cancer  -due for mammogram  - [facility] SHARYN 2D+3D SCREENING BILAT (CPT=77067/06736); Future    3. Mixed hyperlipidemia  -stable, CPM    4. UTI symptoms  -will check as below, reviewed GYN visit and urine testing   - US KIDNEY/BLADDER (CPT=76770); Future     Follow up in 6-12 months.     Zbigniew Gonzalez MD, 4/30/2024, 11:57 AM     Note to patient: The 21st Century Cures Act makes medical notes like these available to patients in the interest of transparency. However, this is a medical document intended as peer to peer communication. It is written in medical language and may contain abbreviations or verbiage that are unfamiliar. It may appear blunt or direct. Medical documents are intended to carry relevant information, facts as evident, and the clinical opinion of the practitioner who signs the document.

## 2024-05-14 ENCOUNTER — HOSPITAL ENCOUNTER (OUTPATIENT)
Dept: MAMMOGRAPHY | Age: 59
Discharge: HOME | End: 2024-05-14
Attending: FAMILY MEDICINE

## 2024-05-14 DIAGNOSIS — Z12.31 SCREENING MAMMOGRAM FOR BREAST CANCER: ICD-10-CM

## 2024-05-14 PROCEDURE — 77063 BREAST TOMOSYNTHESIS BI: CPT | Performed by: FAMILY MEDICINE

## 2024-05-14 PROCEDURE — 77067 SCR MAMMO BI INCL CAD: CPT | Performed by: FAMILY MEDICINE

## 2024-05-23 ENCOUNTER — HOSPITAL ENCOUNTER (OUTPATIENT)
Dept: ULTRASOUND IMAGING | Age: 59
Discharge: HOME | End: 2024-05-23
Attending: FAMILY MEDICINE

## 2024-05-23 DIAGNOSIS — R39.9 UTI SYMPTOMS: ICD-10-CM

## 2024-05-23 PROCEDURE — 76770 US EXAM ABDO BACK WALL COMP: CPT | Performed by: FAMILY MEDICINE

## 2024-05-27 ENCOUNTER — HOSPITAL ENCOUNTER (EMERGENCY)
Age: 59
Discharge: HOME | End: 2024-05-27
Attending: EMERGENCY MEDICINE

## 2024-05-27 ENCOUNTER — OFFICE VISIT (OUTPATIENT)
Dept: FAMILY MEDICINE CLINIC | Facility: CLINIC | Age: 59
End: 2024-05-27

## 2024-05-27 ENCOUNTER — APPOINTMENT (OUTPATIENT)
Dept: CT IMAGING | Age: 59
End: 2024-05-27
Attending: EMERGENCY MEDICINE

## 2024-05-27 VITALS
HEART RATE: 61 BPM | RESPIRATION RATE: 16 BRPM | TEMPERATURE: 98 F | WEIGHT: 128 LBS | BODY MASS INDEX: 25.8 KG/M2 | HEIGHT: 59 IN | DIASTOLIC BLOOD PRESSURE: 80 MMHG | OXYGEN SATURATION: 100 % | SYSTOLIC BLOOD PRESSURE: 142 MMHG

## 2024-05-27 VITALS
RESPIRATION RATE: 18 BRPM | WEIGHT: 128 LBS | TEMPERATURE: 98 F | DIASTOLIC BLOOD PRESSURE: 92 MMHG | BODY MASS INDEX: 26 KG/M2 | OXYGEN SATURATION: 100 % | HEART RATE: 68 BPM | SYSTOLIC BLOOD PRESSURE: 149 MMHG

## 2024-05-27 DIAGNOSIS — N20.1 LEFT URETERAL CALCULUS: Primary | ICD-10-CM

## 2024-05-27 DIAGNOSIS — R10.9 FLANK PAIN: Primary | ICD-10-CM

## 2024-05-27 LAB
ALBUMIN SERPL-MCNC: 3.7 G/DL (ref 3.4–5)
ALBUMIN/GLOB SERPL: 1.2 {RATIO} (ref 1–2)
ALP LIVER SERPL-CCNC: 70 U/L
ALT SERPL-CCNC: 22 U/L
ANION GAP SERPL CALC-SCNC: 6 MMOL/L (ref 0–18)
AST SERPL-CCNC: 23 U/L (ref 15–37)
BASOPHILS # BLD AUTO: 0.05 X10(3) UL (ref 0–0.2)
BASOPHILS NFR BLD AUTO: 0.9 %
BILIRUB SERPL-MCNC: 0.4 MG/DL (ref 0.1–2)
BILIRUB UR QL STRIP.AUTO: NEGATIVE
BUN BLD-MCNC: 22 MG/DL (ref 9–23)
CALCIUM BLD-MCNC: 9.3 MG/DL (ref 8.5–10.1)
CHLORIDE SERPL-SCNC: 104 MMOL/L (ref 98–112)
CLARITY UR REFRACT.AUTO: CLEAR
CO2 SERPL-SCNC: 29 MMOL/L (ref 21–32)
COLOR UR AUTO: YELLOW
CREAT BLD-MCNC: 1.11 MG/DL
EGFRCR SERPLBLD CKD-EPI 2021: 58 ML/MIN/1.73M2 (ref 60–?)
EOSINOPHIL # BLD AUTO: 0.14 X10(3) UL (ref 0–0.7)
EOSINOPHIL NFR BLD AUTO: 2.4 %
ERYTHROCYTE [DISTWIDTH] IN BLOOD BY AUTOMATED COUNT: 15.6 %
GLOBULIN PLAS-MCNC: 3.1 G/DL (ref 2.8–4.4)
GLUCOSE BLD-MCNC: 94 MG/DL (ref 70–99)
GLUCOSE UR STRIP.AUTO-MCNC: NEGATIVE MG/DL
HCT VFR BLD AUTO: 38.6 %
HGB BLD-MCNC: 12.6 G/DL
IMM GRANULOCYTES # BLD AUTO: 0.01 X10(3) UL (ref 0–1)
IMM GRANULOCYTES NFR BLD: 0.2 %
KETONES UR STRIP.AUTO-MCNC: NEGATIVE MG/DL
LEUKOCYTE ESTERASE UR QL STRIP.AUTO: NEGATIVE
LYMPHOCYTES # BLD AUTO: 1.43 X10(3) UL (ref 1–4)
LYMPHOCYTES NFR BLD AUTO: 24.8 %
MCH RBC QN AUTO: 26.8 PG (ref 26–34)
MCHC RBC AUTO-ENTMCNC: 32.6 G/DL (ref 31–37)
MCV RBC AUTO: 82 FL
MONOCYTES # BLD AUTO: 0.69 X10(3) UL (ref 0.1–1)
MONOCYTES NFR BLD AUTO: 12 %
NEUTROPHILS # BLD AUTO: 3.45 X10 (3) UL (ref 1.5–7.7)
NEUTROPHILS # BLD AUTO: 3.45 X10(3) UL (ref 1.5–7.7)
NEUTROPHILS NFR BLD AUTO: 59.7 %
NITRITE UR QL STRIP.AUTO: NEGATIVE
OSMOLALITY SERPL CALC.SUM OF ELEC: 291 MOSM/KG (ref 275–295)
PH UR STRIP.AUTO: 5 [PH] (ref 5–8)
PLATELET # BLD AUTO: 198 10(3)UL (ref 150–450)
POTASSIUM SERPL-SCNC: 3.4 MMOL/L (ref 3.5–5.1)
PROT SERPL-MCNC: 6.8 G/DL (ref 6.4–8.2)
PROT UR STRIP.AUTO-MCNC: NEGATIVE MG/DL
RBC # BLD AUTO: 4.71 X10(6)UL
SODIUM SERPL-SCNC: 139 MMOL/L (ref 136–145)
SP GR UR STRIP.AUTO: <=1.005 (ref 1–1.03)
UROBILINOGEN UR STRIP.AUTO-MCNC: 0.2 MG/DL
WBC # BLD AUTO: 5.8 X10(3) UL (ref 4–11)

## 2024-05-27 PROCEDURE — 99285 EMERGENCY DEPT VISIT HI MDM: CPT

## 2024-05-27 PROCEDURE — 96361 HYDRATE IV INFUSION ADD-ON: CPT

## 2024-05-27 PROCEDURE — 81001 URINALYSIS AUTO W/SCOPE: CPT | Performed by: EMERGENCY MEDICINE

## 2024-05-27 PROCEDURE — 96376 TX/PRO/DX INJ SAME DRUG ADON: CPT

## 2024-05-27 PROCEDURE — 99284 EMERGENCY DEPT VISIT MOD MDM: CPT

## 2024-05-27 PROCEDURE — 96374 THER/PROPH/DIAG INJ IV PUSH: CPT

## 2024-05-27 PROCEDURE — 81015 MICROSCOPIC EXAM OF URINE: CPT | Performed by: EMERGENCY MEDICINE

## 2024-05-27 PROCEDURE — 74176 CT ABD & PELVIS W/O CONTRAST: CPT | Performed by: EMERGENCY MEDICINE

## 2024-05-27 PROCEDURE — 85025 COMPLETE CBC W/AUTO DIFF WBC: CPT | Performed by: EMERGENCY MEDICINE

## 2024-05-27 PROCEDURE — 80053 COMPREHEN METABOLIC PANEL: CPT | Performed by: EMERGENCY MEDICINE

## 2024-05-27 RX ORDER — HYDROCODONE BITARTRATE AND ACETAMINOPHEN 5; 325 MG/1; MG/1
1-2 TABLET ORAL EVERY 6 HOURS PRN
Qty: 10 TABLET | Refills: 0 | Status: SHIPPED | OUTPATIENT
Start: 2024-05-27 | End: 2024-05-31

## 2024-05-27 RX ORDER — TAMSULOSIN HYDROCHLORIDE 0.4 MG/1
0.4 CAPSULE ORAL DAILY
Qty: 7 CAPSULE | Refills: 0 | Status: SHIPPED | OUTPATIENT
Start: 2024-05-27 | End: 2024-05-31

## 2024-05-27 RX ORDER — KETOROLAC TROMETHAMINE 15 MG/ML
15 INJECTION, SOLUTION INTRAMUSCULAR; INTRAVENOUS ONCE
Status: COMPLETED | OUTPATIENT
Start: 2024-05-27 | End: 2024-05-27

## 2024-05-27 NOTE — PROGRESS NOTES
CHIEF COMPLAINT:     Chief Complaint   Patient presents with    Pain     Left side pain on/off now more consistent with bloating.  S/s for 2-3 days otc meds taken.         HPI:   Javi Padilla is a 58 year old female who presents with symptoms of left flank pain x 2 days. At this time rates pain as 7/10, at times pain is 10/10. No hematuria. No fever. Had US of kidney and bladder on 5/23 due to recurrent uti's, was noted at that time that she had mild left hydronephrosis. She denies hx of kidney stones.     Current Outpatient Medications   Medication Sig Dispense Refill    estradiol 0.1 MG/GM Vaginal Cream Place 1 g vaginally daily. (Patient not taking: Reported on 4/30/2024)      fluticasone propionate 50 MCG/ACT Nasal Suspension SHAKE LQ AND U 2 SPRAYS IEN BID      ibuprofen 200 MG Oral Tab Take 3 tablets (600 mg total) by mouth every 6 (six) hours as needed for Pain.      Multiple Vitamins-Minerals (MULTI-VITAMIN/MINERALS) Oral Tab Take 1 tablet by mouth daily.      Cholecalciferol (VITAMIN D) 50 MCG (2000 UT) Oral Cap Take by mouth.        Past Medical History:    Back pain, chronic    COVID-19    COVID-19    Cyst of joint of shoulder    RIGHT    Esophageal reflux    Hx of motion sickness    Irregular periods/menstrual cycles    started 3 years ago    PONV (postoperative nausea and vomiting)    nauseous during epidural    Visual impairment    GLASSES      Social History:  Social History     Socioeconomic History    Marital status:    Tobacco Use    Smoking status: Former     Types: Cigarettes    Smokeless tobacco: Never   Vaping Use    Vaping status: Never Used   Substance and Sexual Activity    Alcohol use: No    Drug use: No    Sexual activity: Yes     Partners: Male     Social Determinants of Health      Received from [facility]         REVIEW OF SYSTEMS:   GENERAL: Denies fever, chills, or body aches  SKIN: no rashes, no skin wounds or ulcers.  GI: See HPI. No N/V/C/D.   : See  HPI.  NEURO: no headaches.    EXAM:   /80   Pulse 61   Temp 97.8 °F (36.6 °C) (Oral)   Resp 16   Ht 4' 11\" (1.499 m)   Wt 128 lb (58.1 kg)   LMP 03/05/2011   SpO2 100%   BMI 25.85 kg/m²   GENERAL: well developed, well nourished,in no apparent distress  CARDIO: RRR, no murmurs  LUNGS: clear to ausculation bilaterally, no wheezing or rhonchi  GI: BS present x 4.  No hepatosplenomegaly.  : no suprapubic tenderness. No bladder distention. L sided CVAT.     No results found for this or any previous visit (from the past 24 hour(s)).      ASSESSMENT AND PLAN:   Javi Padilla is a 58 year old female presents with UTI symptoms.    ASSESSMENT:  Encounter Diagnosis   Name Primary?    Flank pain Yes       PLAN:   Recommended higher level of care for CT to rule out stone. Pt states she will go to [place] ER.   Meds & Refills for this Visit:  Requested Prescriptions      No prescriptions requested or ordered in this encounter

## 2024-05-27 NOTE — ED PROVIDER NOTES
Patient Seen in: [facility] In [place]      History     Chief Complaint   Patient presents with    Abdomen/Flank Pain     Stated Complaint: Left flank pain x 3 days    Subjective:   HPI    Patient is a 58-year-old female with left flank pain.  Is been for 3 days.  Progressively getting worse has been rather persistent.  Patient's pain right now is 7 out of 10.  Is been 10 out of 10 at times.  Outpatient ultrasound was done recently as the patient is undergoing OB/GYN workup for pelvic discomfort.  Ultrasound does not outpatient showed left-sided hydronephrosis.  Pain persisted so she went to the walk-in clinic today and advised cardiac evaluation.  No other complaints     Objective:   Past Medical History:    Back pain, chronic    COVID-19    COVID-19    Cyst of joint of shoulder    RIGHT    Esophageal reflux    Hx of motion sickness    Irregular periods/menstrual cycles    started 3 years ago    PONV (postoperative nausea and vomiting)    nauseous during epidural    Visual impairment    GLASSES              Past Surgical History:   Procedure Laterality Date    Appendectomy      age 16    Appendectomy             delivery only      Colonoscopy N/A 2023    Procedure: COLONOSCOPY;  Surgeon: Radhames Kaiser DO;  Location:  ENDOSCOPY                No pertinent social history.            Review of Systems    Positive for stated complaint: Left flank pain x 3 days  Other systems are as noted in HPI.  Constitutional and vital signs reviewed.      All other systems reviewed and negative except as noted above.    Physical Exam     ED Triage Vitals   BP 24 1521 156/76   Pulse 24 1521 69   Resp 24 1521 20   Temp 24 1521 98 °F (36.7 °C)   Temp src 24 1521 Temporal   SpO2 24 1521 98 %   O2 Device 24 1631 None (Room air)       Current Vitals:   Vital Signs  BP: (!) 149/92  Pulse: 68  Resp: 18  Temp: 98 °F (36.7 °C)  Temp src:  Temporal    Oxygen Therapy  SpO2: 100 %  O2 Device: None (Room air)            Physical Exam  Vitals and nursing note reviewed.   Constitutional:       General: She is not in acute distress.     Appearance: She is well-developed. She is not toxic-appearing.   HENT:      Head: Normocephalic and atraumatic.   Eyes:      General: No scleral icterus.     Conjunctiva/sclera: Conjunctivae normal.   Cardiovascular:      Rate and Rhythm: Normal rate.   Pulmonary:      Effort: Pulmonary effort is normal. No respiratory distress.   Abdominal:      General: There is no distension.      Tenderness: There is no abdominal tenderness. There is no right CVA tenderness or left CVA tenderness.   Musculoskeletal:         General: No tenderness. Normal range of motion.      Cervical back: Normal range of motion and neck supple.   Skin:     General: Skin is warm and dry.      Findings: No rash.   Neurological:      Mental Status: She is alert and oriented to person, place, and time.      Motor: No abnormal muscle tone.      Coordination: Coordination normal.   Psychiatric:         Behavior: Behavior normal.              ED Course     Labs Reviewed   COMP METABOLIC PANEL (14) - Abnormal; Notable for the following components:       Result Value    Potassium 3.4 (*)     Creatinine 1.11 (*)     eGFR-Cr 58 (*)     All other components within normal limits   URINALYSIS WITH CULTURE REFLEX - Abnormal; Notable for the following components:    Blood Urine Trace-Intact (*)     All other components within normal limits   UA MICROSCOPIC ONLY, URINE - Abnormal; Notable for the following components:    Bacteria Urine 1+ (*)     Squamous Epi. Cells Few (*)     All other components within normal limits   CBC WITH DIFFERENTIAL WITH PLATELET    Narrative:     The following orders were created for panel order CBC With Differential With Platelet.  Procedure                               Abnormality         Status                     ---------                                -----------         ------                     CBC W/ DIFFERENTIAL[[id number]]                              Final result                 Please view results for these tests on the individual orders.   CBC W/ DIFFERENTIAL                       MDM         -Pulse oximetry was interpreted by me and was normal.  Pulse oximeter was ordered to monitor patient for hypoxia.               -Comorbidities did add complexity to the management are mentioned in the HPI above        -I personally reviewed the prior external notes and the medical record to obtain additional history I reviewed ultrasound of the kidney urinary bladder Done several days ago.  She does have left hydronephrosis         -DDX: Includes but not limited to kidney stone, pyelonephritis         -I personally reviewed the CT findings and it shows left ureteral calculus  Please refer to radiology report for official interpretation    CT ABDOMEN+PELVIS KIDNEYSTONE 2D RNDR(NO IV,NO ORAL)(CPT=74176)   Final Result   PROCEDURE:  CT ABDOMEN+PELVIS KIDNEYSTONE 2D RNDR(NO IV,NO    ORAL)(CPT=74176)       COMPARISON:  None.       INDICATIONS:  Left flank pain x 3 days       TECHNIQUE:  Unenhanced multislice CT scanning from above the kidneys to    below the urinary bladder.  2D rendering are generated on the CT scanner    workstation to localize potential stones in the cranio-caudal plane.  Dose    reduction techniques were used.    Dose information is transmitted to the ACR (American College of Radiology)    NRDR (National Radiology Data Registry) which includes the Dose Index    Registry.       PATIENT STATED HISTORY: (As transcribed by Technologist)  Lt flank pain    for 3 days.            FINDINGS:     KIDNEYS:  There is a 5 mm obstructing calculus within the left proximal    ureter with mild to moderate hydronephrosis.  No right nephrolithiasis or    hydronephrosis.   BLADDER:  No mass, calculus or significant wall thickening.    ADRENALS:  No mass  or enlargement.     LIVER:  No enlargement, atrophy, abnormal density, or significant focal    lesion.     BILIARY:  No visible dilatation or calcification.     PANCREAS:  No lesion, fluid collection, ductal dilatation, or atrophy.     SPLEEN:  No enlargement or focal lesion.     AORTA/VASCULAR:  No aneurysm.     RETROPERITONEUM:  No mass or adenopathy.     BOWEL/MESENTERY:  No visible mass, obstruction, or bowel wall thickening.        ABDOMINAL WALL:  No mass or hernia.     BONES:  No bony lesion or fracture.   PELVIC ORGANS:  Normal for age.     LUNG BASES:  Mild atelectasis.   OTHER:  Negative.                           =====   CONCLUSION:  5 mm obstructing calculus within the left proximal ureter    with mild to moderate upstream hydronephrosis.               LOCATION:  [facility]           Dictated by (CST): Elliot Boyd MD on 5/27/2024 at 4:39 PM        Finalized by (CST): Elliot Boyd MD on 5/27/2024 at 4:41 PM             Labs Reviewed   COMP METABOLIC PANEL (14) - Abnormal; Notable for the following components:       Result Value    Potassium 3.4 (*)     Creatinine 1.11 (*)     eGFR-Cr 58 (*)     All other components within normal limits   URINALYSIS WITH CULTURE REFLEX - Abnormal; Notable for the following components:    Blood Urine Trace-Intact (*)     All other components within normal limits   UA MICROSCOPIC ONLY, URINE - Abnormal; Notable for the following components:    Bacteria Urine 1+ (*)     Squamous Epi. Cells Few (*)     All other components within normal limits   CBC WITH DIFFERENTIAL WITH PLATELET    Narrative:     The following orders were created for panel order CBC With Differential With Platelet.  Procedure                               Abnormality         Status                     ---------                               -----------         ------                     CBC W/ DIFFERENTIAL[[id number]]                              Final result                 Please view results for these  tests on the individual orders.   CBC W/ DIFFERENTIAL     Labs reviewed.  No definitive evidence of UTI.  Potassium 3.4 creatinine 1.1.  CBC is normal.  Patient was given IV Toradol.  Feels better.  Feels patient feels comfortable enough to go home.  I will have her follow-up with urology.  She was given detailed verbal instructions about her condition and warning signs when to return.  Discharged in stable condition                                           Medical Decision Making      Disposition and Plan     Clinical Impression:  1. Left ureteral calculus         Disposition:  Discharge  5/27/2024  5:09 pm    Follow-up:  Angelo Vieyra MD  [address]  [facility] [address]  [phone]    Follow up in 2 day(s)            Medications Prescribed:  Current Discharge Medication List        START taking these medications    Details   HYDROcodone-acetaminophen 5-325 MG Oral Tab Take 1-2 tablets by mouth every 6 (six) hours as needed for Pain.  Qty: 10 tablet, Refills: 0    Associated Diagnoses: Left ureteral calculus      tamsulosin (FLOMAX) 0.4 MG Oral Cap Take 1 capsule (0.4 mg total) by mouth daily for 7 days.  Qty: 7 capsule, Refills: 0

## 2024-05-28 ENCOUNTER — HOSPITAL ENCOUNTER (OUTPATIENT)
Facility: HOSPITAL | Age: 59
Setting detail: OBSERVATION
Discharge: HOME | End: 2024-05-31
Attending: EMERGENCY MEDICINE | Admitting: HOSPITALIST
Payer: COMMERCIAL

## 2024-05-28 DIAGNOSIS — N20.1 URETEROLITHIASIS: Primary | ICD-10-CM

## 2024-05-28 DIAGNOSIS — N13.2 HYDRONEPHROSIS WITH URINARY OBSTRUCTION DUE TO RENAL CALCULUS: ICD-10-CM

## 2024-05-28 LAB
ANION GAP SERPL CALC-SCNC: 2 MMOL/L (ref 0–18)
BASOPHILS # BLD AUTO: 0.05 X10(3) UL (ref 0–0.2)
BASOPHILS NFR BLD AUTO: 0.8 %
BILIRUB UR QL STRIP.AUTO: NEGATIVE
BUN BLD-MCNC: 25 MG/DL (ref 9–23)
CALCIUM BLD-MCNC: 8.9 MG/DL (ref 8.5–10.1)
CHLORIDE SERPL-SCNC: 102 MMOL/L (ref 98–112)
CLARITY UR REFRACT.AUTO: CLEAR
CO2 SERPL-SCNC: 32 MMOL/L (ref 21–32)
COLOR UR AUTO: YELLOW
CREAT BLD-MCNC: 0.88 MG/DL
EGFRCR SERPLBLD CKD-EPI 2021: 76 ML/MIN/1.73M2 (ref 60–?)
EOSINOPHIL # BLD AUTO: 0.12 X10(3) UL (ref 0–0.7)
EOSINOPHIL NFR BLD AUTO: 1.8 %
ERYTHROCYTE [DISTWIDTH] IN BLOOD BY AUTOMATED COUNT: 15.6 %
GLUCOSE BLD-MCNC: 103 MG/DL (ref 70–99)
GLUCOSE UR STRIP.AUTO-MCNC: NEGATIVE MG/DL
HCT VFR BLD AUTO: 40.3 %
HGB BLD-MCNC: 13 G/DL
IMM GRANULOCYTES # BLD AUTO: 0.02 X10(3) UL (ref 0–1)
IMM GRANULOCYTES NFR BLD: 0.3 %
KETONES UR STRIP.AUTO-MCNC: NEGATIVE MG/DL
LYMPHOCYTES # BLD AUTO: 1.01 X10(3) UL (ref 1–4)
LYMPHOCYTES NFR BLD AUTO: 15.5 %
MCH RBC QN AUTO: 26.6 PG (ref 26–34)
MCHC RBC AUTO-ENTMCNC: 32.3 G/DL (ref 31–37)
MCV RBC AUTO: 82.6 FL
MONOCYTES # BLD AUTO: 0.62 X10(3) UL (ref 0.1–1)
MONOCYTES NFR BLD AUTO: 9.5 %
NEUTROPHILS # BLD AUTO: 4.68 X10 (3) UL (ref 1.5–7.7)
NEUTROPHILS # BLD AUTO: 4.68 X10(3) UL (ref 1.5–7.7)
NEUTROPHILS NFR BLD AUTO: 72.1 %
NITRITE UR QL STRIP.AUTO: NEGATIVE
OSMOLALITY SERPL CALC.SUM OF ELEC: 287 MOSM/KG (ref 275–295)
PH UR STRIP.AUTO: 7 [PH] (ref 5–8)
PLATELET # BLD AUTO: 199 10(3)UL (ref 150–450)
POTASSIUM SERPL-SCNC: 3.8 MMOL/L (ref 3.5–5.1)
PROT UR STRIP.AUTO-MCNC: NEGATIVE MG/DL
RBC # BLD AUTO: 4.88 X10(6)UL
SODIUM SERPL-SCNC: 136 MMOL/L (ref 136–145)
SP GR UR STRIP.AUTO: 1.01 (ref 1–1.03)
UROBILINOGEN UR STRIP.AUTO-MCNC: 0.2 MG/DL
WBC # BLD AUTO: 6.5 X10(3) UL (ref 4–11)

## 2024-05-28 RX ORDER — ONDANSETRON 2 MG/ML
4 INJECTION INTRAMUSCULAR; INTRAVENOUS ONCE
Status: COMPLETED | OUTPATIENT
Start: 2024-05-28 | End: 2024-05-28

## 2024-05-28 RX ORDER — MORPHINE SULFATE 4 MG/ML
2 INJECTION, SOLUTION INTRAMUSCULAR; INTRAVENOUS ONCE
Status: COMPLETED | OUTPATIENT
Start: 2024-05-28 | End: 2024-05-28

## 2024-05-28 RX ORDER — KETOROLAC TROMETHAMINE 15 MG/ML
15 INJECTION, SOLUTION INTRAMUSCULAR; INTRAVENOUS ONCE
Status: COMPLETED | OUTPATIENT
Start: 2024-05-28 | End: 2024-05-28

## 2024-05-29 PROBLEM — N13.2 HYDRONEPHROSIS WITH URINARY OBSTRUCTION DUE TO RENAL CALCULUS: Status: ACTIVE | Noted: 2024-05-29

## 2024-05-29 PROCEDURE — 99222 1ST HOSP IP/OBS MODERATE 55: CPT | Performed by: PHYSICIAN ASSISTANT

## 2024-05-29 PROCEDURE — 99223 1ST HOSP IP/OBS HIGH 75: CPT | Performed by: HOSPITALIST

## 2024-05-29 RX ORDER — SODIUM CHLORIDE 9 MG/ML
INJECTION, SOLUTION INTRAVENOUS CONTINUOUS
Status: DISCONTINUED | OUTPATIENT
Start: 2024-05-29 | End: 2024-05-30

## 2024-05-29 RX ORDER — MORPHINE SULFATE 4 MG/ML
2 INJECTION, SOLUTION INTRAMUSCULAR; INTRAVENOUS EVERY 2 HOUR PRN
Status: DISCONTINUED | OUTPATIENT
Start: 2024-05-29 | End: 2024-05-31

## 2024-05-29 RX ORDER — ONDANSETRON 2 MG/ML
4 INJECTION INTRAMUSCULAR; INTRAVENOUS EVERY 6 HOURS PRN
Status: DISCONTINUED | OUTPATIENT
Start: 2024-05-29 | End: 2024-05-31

## 2024-05-29 RX ORDER — FLUTICASONE PROPIONATE 50 MCG
1 SPRAY, SUSPENSION (ML) NASAL DAILY
Status: DISCONTINUED | OUTPATIENT
Start: 2024-05-29 | End: 2024-05-29

## 2024-05-29 RX ORDER — MELATONIN
3 NIGHTLY PRN
Status: DISCONTINUED | OUTPATIENT
Start: 2024-05-29 | End: 2024-05-31

## 2024-05-29 RX ORDER — FLUTICASONE PROPIONATE 50 MCG
1 SPRAY, SUSPENSION (ML) NASAL
Status: DISCONTINUED | OUTPATIENT
Start: 2024-05-29 | End: 2024-05-31

## 2024-05-29 RX ORDER — OXYCODONE HYDROCHLORIDE 5 MG/1
5 TABLET ORAL EVERY 4 HOURS PRN
Status: DISCONTINUED | OUTPATIENT
Start: 2024-05-29 | End: 2024-05-31

## 2024-05-29 RX ORDER — TAMSULOSIN HYDROCHLORIDE 0.4 MG/1
0.4 CAPSULE ORAL DAILY
Status: DISCONTINUED | OUTPATIENT
Start: 2024-05-29 | End: 2024-05-31

## 2024-05-29 RX ORDER — MORPHINE SULFATE 4 MG/ML
1 INJECTION, SOLUTION INTRAMUSCULAR; INTRAVENOUS EVERY 2 HOUR PRN
Status: DISCONTINUED | OUTPATIENT
Start: 2024-05-29 | End: 2024-05-31

## 2024-05-29 RX ORDER — PROCHLORPERAZINE EDISYLATE 5 MG/ML
5 INJECTION INTRAMUSCULAR; INTRAVENOUS EVERY 8 HOURS PRN
Status: DISCONTINUED | OUTPATIENT
Start: 2024-05-29 | End: 2024-05-31

## 2024-05-29 RX ORDER — MORPHINE SULFATE 4 MG/ML
4 INJECTION, SOLUTION INTRAMUSCULAR; INTRAVENOUS EVERY 2 HOUR PRN
Status: DISCONTINUED | OUTPATIENT
Start: 2024-05-29 | End: 2024-05-31

## 2024-05-29 RX ORDER — ACETAMINOPHEN 500 MG
500 TABLET ORAL EVERY 6 HOURS PRN
Status: DISCONTINUED | OUTPATIENT
Start: 2024-05-29 | End: 2024-05-31

## 2024-05-29 NOTE — PLAN OF CARE
NURSING ADMISSION NOTE      Patient admitted via wheelchair  C/o abdomen/left flank pain.Denies difficulty of urinating.  Morphine given, cold packs applied  IVF.  NPO  Urology to see.  Vital signs stable.  Oriented to room.  Safety precautions initiated.  Bed in low position.  Call light in reach.  Problem: Patient/Family Goals  Goal: Patient/Family Long Term Goal  Description: Patient's Long Term Goal: go home    Interventions:  - Urology consult  -pain med  -IVF  - See additional Care Plan goals for specific interventions  Outcome: Progressing  Goal: Patient/Family Short Term Goal  Description: Patient's Short Term Goal: pain controlled    Interventions:   - pain med as needed  -cold packs  - See additional Care Plan goals for specific interventions  Outcome: Progressing     Problem: GENITOURINARY - ADULT  Goal: Absence of urinary retention  Description: INTERVENTIONS:  - Assess patient’s ability to void and empty bladder  - Monitor intake/output and perform bladder scan as needed  - Follow urinary retention protocol/standard of care  - Consider collaborating with pharmacy to review patient's medication profile  - Implement strategies to promote bladder emptying  Outcome: Progressing     Problem: PAIN - ADULT  Goal: Verbalizes/displays adequate comfort level or patient's stated pain goal  Description: INTERVENTIONS:  - Encourage pt to monitor pain and request assistance  - Assess pain using appropriate pain scale  - Administer analgesics based on type and severity of pain and evaluate response  - Implement non-pharmacological measures as appropriate and evaluate response  - Consider cultural and social influences on pain and pain management  - Manage/alleviate anxiety  - Utilize distraction and/or relaxation techniques  - Monitor for opioid side effects  - Notify MD/LIP if interventions unsuccessful or patient reports new pain  - Anticipate increased pain with activity and pre-medicate as  appropriate  Outcome: Progressing

## 2024-05-29 NOTE — ED QUICK NOTES
Patient and family member verbalized understanding of private car instructions.     Patient refused wheelchair out to triage, walking with a steady gait.

## 2024-05-29 NOTE — ED QUICK NOTES
Orders for admission, patient is aware of plan and ready to go upstairs. Any questions, please call ED RN Deepti at extension [phone].     Patient Covid vaccination status: Fully vaccinated     COVID Test Ordered in ED: None    COVID Suspicion at Admission: N/A    Running Infusions:  None    Mental Status/LOC at time of transport: AOx3    Other pertinent information:   Family is driving the patient over to [facility] for admission.     CIWA score: N/A   NIH score:  N/A

## 2024-05-29 NOTE — PROGRESS NOTES
[facility]   part of [facility]     Hospitalist Progress Note     Javi Padilla Patient Status:  Observation    1965 MRN [id number]   Location [facility] 0SW-A Attending Brian Zarate,    Hosp Day # 0 PCP Zbigniew Gonzalez MD     Chief Complaint: Left flank pain    Subjective:     Patient states pain is currently controlled. It comes in waves. No fever, chills, nausea, vomiting or hematuria.    Objective:    Review of Systems:   A comprehensive review of systems was completed; pertinent positive and negatives stated in subjective.    Vital signs:  Temp:  [97.7 °F (36.5 °C)-98.1 °F (36.7 °C)] 98.1 °F (36.7 °C)  Pulse:  [65-75] 75  Resp:  [16-18] 16  BP: (138-163)/(86-89) 147/87  SpO2:  [96 %-100 %] 96 %    Physical Exam:    General: No acute distress, awake and alert  Respiratory: No wheezes, no rhonchi  Cardiovascular: S1, S2, regular rate and rhythm  Abdomen: Soft, Non-tender, non-distended, positive bowel sounds  Neuro: PARKER x 4  Extremities: No edema    Diagnostic Data:    Labs:  Recent Labs   Lab 24  1524 24  2310   WBC 5.8 6.5   HGB 12.6 13.0   MCV 82.0 82.6   .0 199.0     Recent Labs   Lab 24  1524 24  2310   GLU 94 103*   BUN 22 25*   CREATSERUM 1.11* 0.88   CA 9.3 8.9   ALB 3.7  --     136   K 3.4* 3.8    102   CO2 29.0 32.0   ALKPHO 70  --    AST 23  --    ALT 22  --    BILT 0.4  --    TP 6.8  --      Estimated Creatinine Clearance: 63.9 mL/min (based on SCr of 0.88 mg/dL).    No results for input(s): \"TROP\", \"TROPHS\", \"CK\" in the last 168 hours.    No results for input(s): \"PTP\", \"INR\" in the last 168 hours.     Microbiology  No results found for this visit on 24.    Imaging: Reviewed in Epic.    Medications:    tamsulosin  0.4 mg Oral Daily       Assessment & Plan:      #Left 5mm ureteral stone  -Continue IVF, keep NPO  -Pain control, PRN antiemetics  -Flomax  -Urology on consult     #Left hydronephrosis  -Secondary to  above  -Anticipate resolution with stone removal      Brian Zarate DO    Supplementary Documentation:     Quality:  DVT Mechanical Prophylaxis:   SCDs,    DVT Pharmacologic Prophylaxis   Medication   None     Code Status: Full Code  Thompson: No urinary catheter in place  BRIDGETTE: 0-1 day    Discharge is dependent on: Clinical status, urology recs  At this point Ms. Padilla is expected to be discharge to: Home    The 21st Century Cures Act makes medical notes like these available to patients in the interest of transparency. Please be advised this is a medical document. Medical documents are intended to carry relevant information, facts as evident, and the clinical opinion of the practitioner. The medical note is intended as peer to peer communication and may appear blunt or direct. It is written in medical language and may contain abbreviations or verbiage that are unfamiliar.

## 2024-05-29 NOTE — PLAN OF CARE
Pt alert and oriented x4, glasses. RA. No tele. IVF. Reg diet. C/o pain, see mar. Pt updated on poc. Call light in reach. Safety precautions in place. All needs are met at this time.    Problem: GENITOURINARY - ADULT  Goal: Absence of urinary retention  Description: INTERVENTIONS:  - Assess patient’s ability to void and empty bladder  - Monitor intake/output and perform bladder scan as needed  - Follow urinary retention protocol/standard of care  - Consider collaborating with pharmacy to review patient's medication profile  - Implement strategies to promote bladder emptying  Outcome: Progressing     Problem: PAIN - ADULT  Goal: Verbalizes/displays adequate comfort level or patient's stated pain goal  Description: INTERVENTIONS:  - Encourage pt to monitor pain and request assistance  - Assess pain using appropriate pain scale  - Administer analgesics based on type and severity of pain and evaluate response  - Implement non-pharmacological measures as appropriate and evaluate response  - Consider cultural and social influences on pain and pain management  - Manage/alleviate anxiety  - Utilize distraction and/or relaxation techniques  - Monitor for opioid side effects  - Notify MD/LIP if interventions unsuccessful or patient reports new pain  - Anticipate increased pain with activity and pre-medicate as appropriate  Outcome: Progressing

## 2024-05-29 NOTE — CONSULTS
[facility]   part of [facility]    Report of Consultation    Javi Padilla Patient Status:  Observation    1965 MRN [id number]   Location [facility] 0SW-A Attending Brian Zarate,    Hosp Day # 0 PCP Zbigniew Gonzalez MD     Date of Admission:  2024  Date of Consult:  2024    Reason for Consultation:  Obstructing left ureteral stone    History of Present Illness:  Javi Padilla is a a(n) 58 year old female with hx of chronic back pain, who presented to the ED last night for recurrence of left flank pain. She had been diagnosed with left ureteral stone  at [place] ED and discharged home on Norco and Flomax. Unfortunately she returned because pain returned and Norco did not relieve adequately. She was afebrile and hemodynamically stable at time of presentation. Labs showed normal renal function with serum creatinine 0.88 and normal white count. Urinalysis 3-5 RBC/hpf, rare bacteria. She was admitted for further evaluation and mgmt. Urology consulted.    Patient notes that is her first stone. Was seen the end of April with pelvic pain and pressure with some associated urinary complaints. Treated empirically for UTI, final culture mixed genital kandace. At same time was being evaluated for vaginal bleeding and had pelvic ultrasound. Renal ultrasound completed as ordered by PCP 24 that showed mild left hydronephrosis.     Typically drinks good volumes of fluids, but not a particular water drinker. Last received Morphine at 2am.     History:  Past Medical History:    Back pain, chronic    COVID-19    COVID-19    Cyst of joint of shoulder    RIGHT    Esophageal reflux    Hx of motion sickness    Irregular periods/menstrual cycles    started 3 years ago    PONV (postoperative nausea and vomiting)    nauseous during epidural    Visual impairment    GLASSES     Past Surgical History:   Procedure Laterality Date    Appendectomy      age 16    Appendectomy              delivery only      Colonoscopy N/A 2023    Procedure: COLONOSCOPY;  Surgeon: Radhames Kaiser DO;  Location:  ENDOSCOPY     Family History   Problem Relation Age of Onset    Other (colon cancer) Father     Other (prostate cancer) Father     Colon Cancer Father     Heart Disease Mother     Diabetes Brother     Other (prostate cancer) Brother       reports that she has quit smoking. Her smoking use included cigarettes. She has never used smokeless tobacco. She reports that she does not drink alcohol and does not use drugs.    Allergies:  Allergies   Allergen Reactions    Amoxicillin UNKNOWN       Medications:    Current Facility-Administered Medications:     tamsulosin (Flomax) cap 0.4 mg, 0.4 mg, Oral, Daily    melatonin tab 3 mg, 3 mg, Oral, Nightly PRN    sodium chloride 0.9% infusion, , Intravenous, Continuous    morphINE PF 4 MG/ML injection 1 mg, 1 mg, Intravenous, Q2H PRN **OR** morphINE PF 4 MG/ML injection 2 mg, 2 mg, Intravenous, Q2H PRN **OR** morphINE PF 4 MG/ML injection 4 mg, 4 mg, Intravenous, Q2H PRN    ondansetron (Zofran) 4 MG/2ML injection 4 mg, 4 mg, Intravenous, Q6H PRN    prochlorperazine (Compazine) 10 MG/2ML injection 5 mg, 5 mg, Intravenous, Q8H PRN    fluticasone propionate (Flonase) 50 MCG/ACT nasal suspension 1 spray, 1 spray, Each Nare, Daily PRN    acetaminophen (Tylenol Extra Strength) tab 500 mg, 500 mg, Oral, Q6H PRN    oxyCODONE immediate release tab 5 mg, 5 mg, Oral, Q4H PRN    Review of Systems:  Pertinent items are noted in HPI.    Physical Exam:  /70 (BP Location: Left arm)   Pulse 63   Temp 97.4 °F (36.3 °C) (Oral)   Resp 16   Ht 4' 11\" (1.499 m)   Wt 128 lb (58.1 kg)   LMP 2011   SpO2 97%   BMI 25.85 kg/m²   General appearance: alert, appears stated age, cooperative, and no distress  Head: Normocephalic, without obvious abnormality, atraumatic  Back:  mild L CVAT  Lungs: non labored respirations  Abdomen: soft, NTND  Neurologic: Grossly  normal  Psych appropriate affect and mood    Laboratory Data:  Lab Results   Component Value Date    WBC 6.5 05/28/2024    HGB 13.0 05/28/2024    HCT 40.3 05/28/2024    .0 05/28/2024    CREATSERUM 0.88 05/28/2024    BUN 25 05/28/2024     05/28/2024    K 3.8 05/28/2024     05/28/2024    CO2 32.0 05/28/2024     05/28/2024    CA 8.9 05/28/2024       Urinalysis Results (last three years):  Recent Labs     05/27/24  1536 05/28/24  2331   COLORUR Yellow Yellow   CLARITY Clear Clear   SPECGRAVITY <=1.005 1.015   PHURINE 5.0 7.0   PROUR Negative Negative   GLUUR Negative Negative   KETUR Negative Negative   BILUR Negative Negative   BLOODURINE Trace-Intact* Small*   NITRITE Negative Negative   UROBILINOGEN 0.2 0.2   LEUUR Negative Trace*   WBCUR None Seen 1-5  1-5   RBCUR None Seen 3-5*  3-5*   BACUR 1+* Rare*  Rare*       Urine Culture Results (last three years):  No results found for: \"URINECUL\"    Imaging  CT ABDOMEN+PELVIS KIDNEYSTONE 2D RNDR(NO IV,NO ORAL)(CPT=74176)    Result Date: 5/27/2024  PROCEDURE:  CT ABDOMEN+PELVIS KIDNEYSTONE 2D RNDR(NO IV,NO ORAL)(CPT=74176)  COMPARISON:  None.  INDICATIONS:  Left flank pain x 3 days  TECHNIQUE:  Unenhanced multislice CT scanning from above the kidneys to below the urinary bladder.  2D rendering are generated on the CT scanner workstation to localize potential stones in the cranio-caudal plane.  Dose reduction techniques were used. Dose information is transmitted to the ACR (American College of Radiology) NRDR (National Radiology Data Registry) which includes the Dose Index Registry.  PATIENT STATED HISTORY: (As transcribed by Technologist)  Lt flank pain for 3 days.    FINDINGS:  KIDNEYS:  There is a 5 mm obstructing calculus within the left proximal ureter with mild to moderate hydronephrosis.  No right nephrolithiasis or hydronephrosis. BLADDER:  No mass, calculus or significant wall thickening. ADRENALS:  No mass or enlargement.  LIVER:  No  enlargement, atrophy, abnormal density, or significant focal lesion.  BILIARY:  No visible dilatation or calcification.  PANCREAS:  No lesion, fluid collection, ductal dilatation, or atrophy.  SPLEEN:  No enlargement or focal lesion.  AORTA/VASCULAR:  No aneurysm.  RETROPERITONEUM:  No mass or adenopathy.  BOWEL/MESENTERY:  No visible mass, obstruction, or bowel wall thickening.  ABDOMINAL WALL:  No mass or hernia.  BONES:  No bony lesion or fracture. PELVIC ORGANS:  Normal for age.  LUNG BASES:  Mild atelectasis. OTHER:  Negative.             CONCLUSION:  5 mm obstructing calculus within the left proximal ureter with mild to moderate upstream hydronephrosis.    LOCATION:  [place]   Dictated by (CST): Elliot Boyd MD on 5/27/2024 at 4:39 PM     Finalized by (CST): Elliot Boyd MD on 5/27/2024 at 4:41 PM       US KIDNEY/BLADDER (CPT=76770)    Result Date: 5/23/2024  PROCEDURE:  US KIDNEY/BLADDER (CPT=76770)  COMPARISON:  None.  INDICATIONS:  R39.9 UTI symptoms  TECHNIQUE:  Transabdominal gray scale ultrasound imaging of the bilateral kidneys and bladder was performed.  Routine technique was utilized.   PATIENT STATED HISTORY: (As transcribed by Technologist)     FINDINGS:   RIGHT KIDNEY MEASUREMENTS:  Measures 10.6 x 3.1 x 4.3 cm. ECHOGENICITY:  Normal. HYDRONEPHROSIS:  No hydronephrosis.  An extrarenal pelvis on the right is noted. CYSTS/STONES/MASSES:  None.  LEFT KIDNEY MEASUREMENTS:  Measures 10 x 3.9 x 4.4 cm. ECHOGENICITY:  Normal. HYDRONEPHROSIS:  Mild left-sided hydronephrosis. CYSTS/STONES/MASSES:  None.  BLADDER:  Normal. OTHER:  Negative.            CONCLUSION:  1. Mild left-sided hydronephrosis.  The bladder is unremarkable.  No renal mass noted bilaterally.    LOCATION:  [id number]     Dictated by (CST): Hema Chan MD on 5/23/2024 at 5:58 PM     Finalized by (CST): Hema Chan MD on 5/23/2024 at 5:59 PM       [facility] SHARYN 2D+3D SCREENING BILAT (CPT=77067/14161)    Result Date:  5/14/2024  PROCEDURE:  [facility] SHARYN 2D+3D SCREENING BILAT (CPT=77067/55513)  COMPARISON:  TRUMAN , [facility] DIGITAL SCRN W/CAD, 10/30/2009, 10:56 AM.  [facility] DIGITAL SCREENING [facility] W/ CAD, 2/04/2011, 11:48 AM.  TRUMAN , DIGITAL SCREENING [facility] W/ CAD, 10/17/2012, 10:30 AM.  [facility], [facility] SHARYN 2D+3D SCREENING BILAT (CPT=77067/48934), 5/03/2023, 10:25 AM.  INDICATIONS:  Z12.31 Screening mammogram for breast cancer  VIEWS OBTAINED:  Routine views of both breasts were obtained.  Standard 2D and additional multiplane thin sections of the breasts were obtained for the purpose of Tomosynthesis evaluation.  The images were reconstructed and reviewed on the dedicated Tomosynthesis workstation.  BREAST COMPOSITION:  Scattered areas fibroglandular density.  FINDINGS:  An asymmetry versus tissue is questioned in the upper posterior left breast.  There are no spiculated masses, areas of nonsurgical distortion, or suspicious grouped calcifications in the right breast.             CONCLUSION:  No suspicious change from prior mammography.  No mammographic evidence of malignancy.  BI-RADS CATEGORY:  DIAGNOSTIC CATEGORY 0--INCOMPLETE ASSESSMENT: NEED ADDITIONAL IMAGING EVALUATION.   RECOMMENDATIONS:  ADDITIONAL MAMMOGRAPHIC VIEWS REQUIRED--We will call the patient back for additional views and issue an addendum report. LEFT BREAST       A letter explaining the results in lay terms has been sent to the patient.  This exam was evaluated with a computer-aided device.  This patient's information has been entered into a reminder system with a target due date for the next mammogram.   LOCATION:  [facility]   Dictated by (CST): Stacey Rodgers MD on 5/14/2024 at 2:18 PM     Finalized by (CST): Stacey Rodgers MD on 5/14/2024 at 2:23 PM             Impression:  Patient Active Problem List   Diagnosis    Ureterolithiasis    Hydronephrosis with urinary obstruction due to renal calculus       58 year old female with hx  of chronic back pain, who presented to the ED last night for recurrence of left flank pain. She had been diagnosed with left ureteral stone 5/27 at [place] ED and discharged home on Norco and Flomax. Unfortunately she returned because pain returned and Norco did not relieve adequately. She was afebrile and hemodynamically stable at time of presentation. Labs showed normal renal function with serum creatinine 0.88 and normal white count. Urinalysis 3-5 RBC/hpf, rare bacteria. She was admitted for further evaluation and mgmt. Urology consulted.    Reviewed with patient CT scan results. Stone likely closer to ~7.5mm as noted on Coronal images (series 601, image 52). No additional stones noted. We reviewed consideration for inpatient vs outpatient management of her stone. At this time no surgery is anticipated today. After discussion with attending, will plan for Friday 0700 cystoscopy with left retrograde pyelogram, ureteroscopy, laser lithotripsy, stone extraction, left ureteral stent placement with Dr. Malou Saldaña.     Recommendations:  OK for diet today. If her pain remains controlled she could discharge home and return as outpatient for treatment of stone on Friday morning. Should continue straining urine, pain medication prn.     Updated nursing staff with plan above.     Thank you for allowing me to participate in the care of your patient.    Ana Luisa Alva PA-C  [facility]  5/29/2024  12:37 PM

## 2024-05-29 NOTE — ED PROVIDER NOTES
Patient Seen in: [facility] [place]      History     Chief Complaint   Patient presents with    Abdomen/Flank Pain     Stated Complaint: back pain, kidney stone    Subjective:   HPI    58-year-old female presents ED with complaints of left-sided flank pain and abdominal pain.  Reports that she was seen in the ED yesterday and diagnosed with a 5 mm left ureteral kidney stone as seen on her CT scan results that are reviewed.  Patient reports that she has been taking Norco as well as Flomax without improvement of her pain.  Reports the pain has been as high as 8 out of 10.  Felt like she was going to pass out earlier today which is what prompted her to come to the ER.  Denies any fever or chills.  Currently pain is 6 out of 10.  Reports pain comes in waves.    Objective:   Past Medical History:    Back pain, chronic    COVID-19    COVID-19    Cyst of joint of shoulder    RIGHT    Esophageal reflux    Hx of motion sickness    Irregular periods/menstrual cycles    started 3 years ago    PONV (postoperative nausea and vomiting)    nauseous during epidural    Visual impairment    GLASSES              Past Surgical History:   Procedure Laterality Date    Appendectomy      age 16    Appendectomy             delivery only      Colonoscopy N/A 2023    Procedure: COLONOSCOPY;  Surgeon: Radhames Kaiser DO;  Location:  ENDOSCOPY                Social History     Socioeconomic History    Marital status:    Tobacco Use    Smoking status: Former     Types: Cigarettes    Smokeless tobacco: Never   Vaping Use    Vaping status: Never Used   Substance and Sexual Activity    Alcohol use: No    Drug use: No    Sexual activity: Yes     Partners: Male     Social Determinants of Health      Received from [facility]              Review of Systems    Positive for stated complaint: back pain, kidney stone  Other systems are as noted in HPI.  Constitutional and vital signs  reviewed.      All other systems reviewed and negative except as noted above.    Physical Exam     ED Triage Vitals [05/28/24 2133]   BP (!) 163/86   Pulse 73   Resp 16   Temp 97.7 °F (36.5 °C)   Temp src    SpO2 99 %   O2 Device None (Room air)       Current Vitals:   Vital Signs  BP: 138/89  Pulse: 66  Resp: 16  Temp: 97.7 °F (36.5 °C)    Oxygen Therapy  SpO2: 98 %  O2 Device: None (Room air)            Physical Exam  Vitals and nursing note reviewed.   Constitutional:       Appearance: She is well-developed.   HENT:      Head: Normocephalic and atraumatic.   Eyes:      Pupils: Pupils are equal, round, and reactive to light.   Cardiovascular:      Rate and Rhythm: Normal rate and regular rhythm.   Pulmonary:      Effort: Pulmonary effort is normal.      Breath sounds: Normal breath sounds.   Abdominal:      General: Bowel sounds are normal.      Palpations: Abdomen is soft.      Comments: Left CVA tenderness left lower quadrant abdominal tenderness palpation no rebound guarding or rigidity   Musculoskeletal:         General: No deformity.      Cervical back: Normal range of motion and neck supple.   Skin:     General: Skin is warm and dry.      Capillary Refill: Capillary refill takes less than 2 seconds.   Neurological:      Mental Status: She is alert and oriented to person, place, and time.               ED Course     Labs Reviewed   BASIC METABOLIC PANEL (8) - Abnormal; Notable for the following components:       Result Value    Glucose 103 (*)     BUN 25 (*)     All other components within normal limits   URINALYSIS WITH CULTURE REFLEX - Abnormal; Notable for the following components:    Blood Urine Small (*)     Leukocyte Esterase Urine Trace (*)     RBC Urine 3-5 (*)     Bacteria Urine Rare (*)     Squamous Epi. Cells Moderate (*)     All other components within normal limits   UA MICROSCOPIC ONLY, URINE - Abnormal; Notable for the following components:    RBC Urine 3-5 (*)     Bacteria Urine Rare (*)      Squamous Epi. Cells Moderate (*)     All other components within normal limits   CBC WITH DIFFERENTIAL WITH PLATELET    Narrative:     The following orders were created for panel order CBC With Differential With Platelet.  Procedure                               Abnormality         Status                     ---------                               -----------         ------                     CBC W/ DIFFERENTIAL[[id number]]                              Final result                 Please view results for these tests on the individual orders.   URINE CULTURE, ROUTINE   URINE CULTURE, ROUTINE   CBC W/ DIFFERENTIAL             CT ABDOMEN+PELVIS KIDNEYSTONE 2D RNDR(NO IV,NO ORAL)(CPT=74176)    Result Date: 5/27/2024  CONCLUSION:  5 mm obstructing calculus within the left proximal ureter with mild to moderate upstream hydronephrosis.    LOCATION:  [facility]   Dictated by (CST): Elliot Boyd MD on 5/27/2024 at 4:39 PM     Finalized by (CST): Elliot Boyd MD on 5/27/2024 at 4:41 PM       US KIDNEY/BLADDER (CPT=76770)    Result Date: 5/23/2024  CONCLUSION:  1. Mild left-sided hydronephrosis.  The bladder is unremarkable.  No renal mass noted bilaterally.    LOCATION:  [id number]     Dictated by (CST): Hema Chan MD on 5/23/2024 at 5:58 PM     Finalized by (CST): Hema Chan MD on 5/23/2024 at 5:59 PM               MDM      This is a 50-year-old female presents ED with complaints of vital signs stable arrival patient appears nontoxic examination no CVA tenderness left lower quadrant abdominal tenderness palpation no rebound guarding or rigidity.  IV was placed basic labs obtained urinalysis is contaminated with squamous epithelial cells rare bacteria trace leukocyte Estrace.  White blood cell count within normal limits however.  Intractable abdominal pain.  Afebrile.  Will wait for culture treat.  Will keep patient n.p.o. admit to hospitalist consult urology in the morning.  Admission disposition: 5/29/2024  12:24 AM                                        Medical Decision Making      Disposition and Plan     Clinical Impression:  1. Ureterolithiasis         Disposition:  Admit  5/29/2024 12:24 am    Follow-up:  No follow-up provider specified.        Medications Prescribed:  Current Discharge Medication List                            Hospital Problems       Present on Admission  Date Reviewed: 5/27/2024            ICD-10-CM Noted POA    * (Principal) Ureterolithiasis N20.1 5/28/2024 Unknown

## 2024-05-29 NOTE — H&P
[place] [facility]  History and Physical     Javi Padilla Patient Status:  Emergency    1965 MRN [id number]   Location [place] [facility] [place] Attending Pricilla Hudson DO   Hosp Day # 0 PCP Zbigniew Gonzalez MD     Chief Complaint: back pain    Subjective:    History of Present Illness:     Javi Padilla is a 58 year old female with no chronic medical history presents emergency room with left-sided flank pain abdominal pain.  This started yesterday patient was seen in the emergency room yesterday was diagnosed with a 5 mm left ureteral kidney stone and was sent home on Norco and Flomax patient went home and continued to have 8 out of 10 pain to the point that she felt like she was going to pass out and so she returned to the emergency room.  Patient denies any nausea or vomiting.  The pain is intermittent sharp in nature.  No exacerbating or remitting factors.  No fevers, chills, diarrhea or constipation.  No hematochezia, melena, hematuria.    History/Other:    Past Medical History:  Past Medical History:    Back pain, chronic    COVID-19    COVID-19    Cyst of joint of shoulder    RIGHT    Esophageal reflux    Hx of motion sickness    Irregular periods/menstrual cycles    started 3 years ago    PONV (postoperative nausea and vomiting)    nauseous during epidural    Visual impairment    GLASSES     Past Surgical History:   Past Surgical History:   Procedure Laterality Date    Appendectomy      age 16    Appendectomy             delivery only      Colonoscopy N/A 2023    Procedure: COLONOSCOPY;  Surgeon: Radhames Kaiser DO;  Location:  ENDOSCOPY      Family History:   Family History   Problem Relation Age of Onset    Other (colon cancer) Father     Other (prostate cancer) Father     Colon Cancer Father     Heart Disease Mother     Diabetes Brother     Other (prostate cancer) Brother      Social History:    reports that she has quit smoking. Her smoking use  included cigarettes. She has never used smokeless tobacco. She reports that she does not drink alcohol and does not use drugs.     Allergies:   Allergies   Allergen Reactions    Amoxicillin UNKNOWN       Medications:    Current Facility-Administered Medications on File Prior to Encounter   Medication Dose Route Frequency Provider Last Rate Last Admin    [COMPLETED] sodium chloride 0.9 % IV bolus 1,000 mL  1,000 mL Intravenous Once Anastasiia Martinez MD   Stopped at 05/27/24 1714    [COMPLETED] ketorolac (Toradol) 15 MG/ML injection 15 mg  15 mg Intravenous Once Anastasiia Martinez MD   15 mg at 05/27/24 1533    [COMPLETED] ketorolac (Toradol) 15 MG/ML injection 15 mg  15 mg Intravenous Once Anastasiia Martinez MD   15 mg at 05/27/24 1714     Current Outpatient Medications on File Prior to Encounter   Medication Sig Dispense Refill    HYDROcodone-acetaminophen 5-325 MG Oral Tab Take 1-2 tablets by mouth every 6 (six) hours as needed for Pain. 10 tablet 0    tamsulosin (FLOMAX) 0.4 MG Oral Cap Take 1 capsule (0.4 mg total) by mouth daily for 7 days. 7 capsule 0    fluticasone propionate 50 MCG/ACT Nasal Suspension SHAKE LQ AND U 2 SPRAYS IEN BID      ibuprofen 200 MG Oral Tab Take 3 tablets (600 mg total) by mouth every 6 (six) hours as needed for Pain.      Multiple Vitamins-Minerals (MULTI-VITAMIN/MINERALS) Oral Tab Take 1 tablet by mouth daily.      Cholecalciferol (VITAMIN D) 50 MCG (2000 UT) Oral Cap Take by mouth.      estradiol 0.1 MG/GM Vaginal Cream Place 1 g vaginally daily. (Patient not taking: Reported on 4/30/2024)         Review of Systems:   A comprehensive review of systems was completed.    Pertinent positives and negatives noted in the HPI.    Objective:   Physical Exam:    /89   Pulse 66   Temp 97.7 °F (36.5 °C)   Resp 16   Ht 4' 11\" (1.499 m)   Wt 128 lb (58.1 kg)   LMP 03/05/2011   SpO2 98%   BMI 25.85 kg/m²   General: No acute distress, Alert  Respiratory: No rhonchi, no  wheezes  Cardiovascular: S1, S2. Regular rate and rhythm  Abdomen: Soft, Non-tender, non-distended, positive bowel sounds  Neuro: No new focal deficits  Extremities: No edema      Results:    Labs:      Labs Last 24 Hours:    Recent Labs   Lab 05/27/24  1524 05/28/24  2310   RBC 4.71 4.88   HGB 12.6 13.0   HCT 38.6 40.3   MCV 82.0 82.6   MCH 26.8 26.6   MCHC 32.6 32.3   RDW 15.6 15.6   NEPRELIM 3.45 4.68   WBC 5.8 6.5   .0 199.0       Recent Labs   Lab 05/27/24  1524 05/28/24  2310   GLU 94 103*   BUN 22 25*   CREATSERUM 1.11* 0.88   EGFRCR 58* 76   CA 9.3 8.9   ALB 3.7  --     136   K 3.4* 3.8    102   CO2 29.0 32.0   ALKPHO 70  --    AST 23  --    ALT 22  --    BILT 0.4  --    TP 6.8  --        No results found for: \"PT\", \"INR\"    No results for input(s): \"TROP\", \"TROPHS\", \"CK\" in the last 168 hours.    No results for input(s): \"TROP\", \"PBNP\" in the last 168 hours.    No results for input(s): \"PCT\" in the last 168 hours.    Imaging: Imaging data reviewed in Epic.    Assessment & Plan:      # Left 5mm ureteral stone  - Continue IVF  - Continue IV pain meds  - Urology on consult  - Will keep strict NPO.    # left hydronephrosis  - Secondary to above.  -Anticipate resolution with stone removal.    Plan of care discussed with patient at bedside    Zane Dougherty, DO    Supplementary Documentation:     The 21st Century Cures Act makes medical notes like these available to patients in the interest of transparency. Please be advised this is a medical document. Medical documents are intended to carry relevant information, facts as evident, and the clinical opinion of the practitioner. The medical note is intended as peer to peer communication and may appear blunt or direct. It is written in medical language and may contain abbreviations or verbiage that are unfamiliar.

## 2024-05-29 NOTE — ED INITIAL ASSESSMENT (HPI)
Pt states was here yesterday and dx with kidney stone. States pain is to bad at home. C/O nausea and shakes from pain.

## 2024-05-30 ENCOUNTER — ANESTHESIA EVENT (OUTPATIENT)
Dept: SURGERY | Facility: HOSPITAL | Age: 59
End: 2024-05-30
Payer: COMMERCIAL

## 2024-05-30 LAB
ANION GAP SERPL CALC-SCNC: 4 MMOL/L (ref 0–18)
BASOPHILS # BLD AUTO: 0.06 X10(3) UL (ref 0–0.2)
BASOPHILS NFR BLD AUTO: 0.9 %
BUN BLD-MCNC: 12 MG/DL (ref 9–23)
CALCIUM BLD-MCNC: 8.3 MG/DL (ref 8.5–10.1)
CHLORIDE SERPL-SCNC: 113 MMOL/L (ref 98–112)
CO2 SERPL-SCNC: 25 MMOL/L (ref 21–32)
CREAT BLD-MCNC: 0.55 MG/DL
EGFRCR SERPLBLD CKD-EPI 2021: 106 ML/MIN/1.73M2 (ref 60–?)
EOSINOPHIL # BLD AUTO: 0.15 X10(3) UL (ref 0–0.7)
EOSINOPHIL NFR BLD AUTO: 2.1 %
ERYTHROCYTE [DISTWIDTH] IN BLOOD BY AUTOMATED COUNT: 15.3 %
GLUCOSE BLD-MCNC: 86 MG/DL (ref 70–99)
HCT VFR BLD AUTO: 41.5 %
HGB BLD-MCNC: 12.9 G/DL
IMM GRANULOCYTES # BLD AUTO: 0.02 X10(3) UL (ref 0–1)
IMM GRANULOCYTES NFR BLD: 0.3 %
LYMPHOCYTES # BLD AUTO: 1.33 X10(3) UL (ref 1–4)
LYMPHOCYTES NFR BLD AUTO: 19 %
MCH RBC QN AUTO: 26.4 PG (ref 26–34)
MCHC RBC AUTO-ENTMCNC: 31.1 G/DL (ref 31–37)
MCV RBC AUTO: 84.9 FL
MONOCYTES # BLD AUTO: 0.82 X10(3) UL (ref 0.1–1)
MONOCYTES NFR BLD AUTO: 11.7 %
NEUTROPHILS # BLD AUTO: 4.62 X10 (3) UL (ref 1.5–7.7)
NEUTROPHILS # BLD AUTO: 4.62 X10(3) UL (ref 1.5–7.7)
NEUTROPHILS NFR BLD AUTO: 66 %
OSMOLALITY SERPL CALC.SUM OF ELEC: 293 MOSM/KG (ref 275–295)
PLATELET # BLD AUTO: 153 10(3)UL (ref 150–450)
POTASSIUM SERPL-SCNC: 3.6 MMOL/L (ref 3.5–5.1)
RBC # BLD AUTO: 4.89 X10(6)UL
SODIUM SERPL-SCNC: 142 MMOL/L (ref 136–145)
WBC # BLD AUTO: 7 X10(3) UL (ref 4–11)

## 2024-05-30 PROCEDURE — 99231 SBSQ HOSP IP/OBS SF/LOW 25: CPT | Performed by: PHYSICIAN ASSISTANT

## 2024-05-30 PROCEDURE — 99232 SBSQ HOSP IP/OBS MODERATE 35: CPT | Performed by: INTERNAL MEDICINE

## 2024-05-30 RX ORDER — SODIUM CHLORIDE 9 MG/ML
INJECTION, SOLUTION INTRAVENOUS CONTINUOUS
Status: DISCONTINUED | OUTPATIENT
Start: 2024-05-30 | End: 2024-05-31

## 2024-05-30 NOTE — ANESTHESIA PREPROCEDURE EVALUATION
PRE-OP EVALUATION    Patient Name: Javi Padilla    Admit Diagnosis: Ureterolithiasis [N20.1]    Pre-op Diagnosis: Ureterolithiasis [N20.1]    CYSTOSCOPY, LEFT RETROGRADE PYELOGRAM, LEFT URETEROSCOPY, LASER LITHOTRIPSY, STONE EXTRACTION, LEFT URETERAL STENT PLACEMENT    Anesthesia Procedure: CYSTOSCOPY, LEFT RETROGRADE PYELOGRAM, LEFT URETEROSCOPY, LASER LITHOTRIPSY, STONE EXTRACTION, LEFT URETERAL STENT PLACEMENT (Left)    Surgeons and Role:     * Malou Saldaña MD - Primary    Pre-op vitals reviewed.  Temp: 98.2 °F (36.8 °C)  Pulse: 69  Resp: 16  BP: 138/84  SpO2: 96 %  Body mass index is 25.85 kg/m².    Current medications reviewed.  Hospital Medications:   sodium chloride 0.9% infusion   Intravenous Continuous    [START ON 5/31/2024] ceFAZolin (Ancef) 2g in 10mL IV syringe premix  2 g Intravenous On Call to OR    tamsulosin (Flomax) cap 0.4 mg  0.4 mg Oral Daily    melatonin tab 3 mg  3 mg Oral Nightly PRN    morphINE PF 4 MG/ML injection 1 mg  1 mg Intravenous Q2H PRN    Or    morphINE PF 4 MG/ML injection 2 mg  2 mg Intravenous Q2H PRN    Or    morphINE PF 4 MG/ML injection 4 mg  4 mg Intravenous Q2H PRN    ondansetron (Zofran) 4 MG/2ML injection 4 mg  4 mg Intravenous Q6H PRN    prochlorperazine (Compazine) 10 MG/2ML injection 5 mg  5 mg Intravenous Q8H PRN    fluticasone propionate (Flonase) 50 MCG/ACT nasal suspension 1 spray  1 spray Each Nare Daily PRN    acetaminophen (Tylenol Extra Strength) tab 500 mg  500 mg Oral Q6H PRN    oxyCODONE immediate release tab 5 mg  5 mg Oral Q4H PRN    [COMPLETED] ketorolac (Toradol) 15 MG/ML injection 15 mg  15 mg Intravenous Once    [COMPLETED] morphINE PF 4 MG/ML injection 2 mg  2 mg Intravenous Once    [COMPLETED] ondansetron (Zofran) 4 MG/2ML injection 4 mg  4 mg Intravenous Once    [COMPLETED] sodium chloride 0.9 % IV bolus 1,000 mL  1,000 mL Intravenous Once       Outpatient Medications:     Medications Prior to Admission   Medication Sig Dispense Refill Last  Dose    HYDROcodone-acetaminophen 5-325 MG Oral Tab Take 1-2 tablets by mouth every 6 (six) hours as needed for Pain. 10 tablet 0 2024 at 1500    tamsulosin (FLOMAX) 0.4 MG Oral Cap Take 1 capsule (0.4 mg total) by mouth daily for 7 days. 7 capsule 0 2024 at 1500    fluticasone propionate 50 MCG/ACT Nasal Suspension 2 sprays by Nasal route as needed.   2024    ibuprofen 200 MG Oral Tab Take 3 tablets (600 mg total) by mouth every 6 (six) hours as needed for Pain.   Taking    Multiple Vitamins-Minerals (MULTI-VITAMIN/MINERALS) Oral Tab Take 1 tablet by mouth daily.   2024 at 0900    Cholecalciferol (VITAMIN D) 50 MCG (2000 UT) Oral Cap Take by mouth.   2024 at 0900    estradiol 0.1 MG/GM Vaginal Cream Place 1 g vaginally daily. (Patient not taking: Reported on 2024)          Allergies: Amoxicillin      Anesthesia Evaluation        Anesthetic Complications           GI/Hepatic/Renal      (+) GERD                           Cardiovascular    Negative cardiovascular ROS.    Exercise tolerance: good     MET: >4                                           Endo/Other    Negative endo/other ROS.                              Pulmonary    Negative pulmonary ROS.                       Neuro/Psych                                Left ureteral stone        Past Surgical History:   Procedure Laterality Date    Appendectomy      age 16    Appendectomy             delivery only      Colonoscopy N/A 2023    Procedure: COLONOSCOPY;  Surgeon: Radhames Kaiser DO;  Location:  ENDOSCOPY     Social History     Socioeconomic History    Marital status:    Tobacco Use    Smoking status: Former     Types: Cigarettes    Smokeless tobacco: Never   Vaping Use    Vaping status: Never Used   Substance and Sexual Activity    Alcohol use: No    Drug use: No    Sexual activity: Yes     Partners: Male     History   Drug Use No     Available pre-op labs reviewed.  Lab Results   Component Value  Date    WBC 7.0 05/30/2024    RBC 4.89 05/30/2024    HGB 12.9 05/30/2024    HCT 41.5 05/30/2024    MCV 84.9 05/30/2024    MCH 26.4 05/30/2024    MCHC 31.1 05/30/2024    RDW 15.3 05/30/2024    .0 05/30/2024     Lab Results   Component Value Date     05/30/2024    K 3.6 05/30/2024     (H) 05/30/2024    CO2 25.0 05/30/2024    BUN 12 05/30/2024    CREATSERUM 0.55 05/30/2024    GLU 86 05/30/2024    CA 8.3 (L) 05/30/2024            Airway      Mallampati: II  Mouth opening: >3 FB  TM distance: 4 - 6 cm  Neck ROM: full Cardiovascular    Cardiovascular exam normal.         Dental             Pulmonary    Pulmonary exam normal.                 Other findings  Dentition grossly normal.            ASA: 2   Plan: general  NPO status verified and patient meets guidelines.          Plan/risks discussed with: patient                Present on Admission:   Ureterolithiasis   Hydronephrosis with urinary obstruction due to renal calculus

## 2024-05-30 NOTE — PLAN OF CARE
Patient alert and oriented x4   No current c/o pain or SOB, on RA  Non tele monitoring, peripheral pulses palpable  Patient up ad florentino, voids  Urine being strained  IVF discontinued this morning   Patient to have Cystoscopy on 5/31  POC discussed w/ patient   Safety precautions in place     /84 (BP Location: Left arm)   Pulse 69   Temp 98.2 °F (36.8 °C) (Oral)   Resp 16   Ht 149.9 cm (4' 11\")   Wt 58.1 kg (128 lb)   LMP 03/05/2011   SpO2 96%   BMI 25.85 kg/m²     Problem: Patient/Family Goals  Goal: Patient/Family Long Term Goal  Description: Patient's Long Term Goal: go home    Interventions:  - Urology consult  -pain med  -IVF  - See additional Care Plan goals for specific interventions  5/30/2024 1046 by Margaux Coelho RN  Outcome: Progressing  5/30/2024 1046 by Margaux Coelho RN  Outcome: Progressing  Goal: Patient/Family Short Term Goal  Description: Patient's Short Term Goal: pain controlled    Interventions:   - pain med as needed  -cold packs  - See additional Care Plan goals for specific interventions  5/30/2024 1046 by Margaux Coelho RN  Outcome: Progressing  5/30/2024 1046 by Margaux Coelho RN  Outcome: Progressing     Problem: GENITOURINARY - ADULT  Goal: Absence of urinary retention  Description: INTERVENTIONS:  - Assess patient’s ability to void and empty bladder  - Monitor intake/output and perform bladder scan as needed  - Follow urinary retention protocol/standard of care  - Consider collaborating with pharmacy to review patient's medication profile  - Implement strategies to promote bladder emptying  5/30/2024 1046 by Margaux Coelho RN  Outcome: Progressing  5/30/2024 1046 by Margaux Coelho RN  Outcome: Progressing     Problem: PAIN - ADULT  Goal: Verbalizes/displays adequate comfort level or patient's stated pain goal  Description: INTERVENTIONS:  - Encourage pt to monitor pain and request assistance  - Assess pain using appropriate pain scale  - Administer analgesics  based on type and severity of pain and evaluate response  - Implement non-pharmacological measures as appropriate and evaluate response  - Consider cultural and social influences on pain and pain management  - Manage/alleviate anxiety  - Utilize distraction and/or relaxation techniques  - Monitor for opioid side effects  - Notify MD/LIP if interventions unsuccessful or patient reports new pain  - Anticipate increased pain with activity and pre-medicate as appropriate  5/30/2024 1046 by Margaux Coelho, RN  Outcome: Progressing  5/30/2024 1046 by Margaux Coelho, RN  Outcome: Progressing

## 2024-05-30 NOTE — PROGRESS NOTES
[facility]   part of [facility]     Hospitalist Progress Note     Javi Padilla Patient Status:  Observation    1965 MRN [id number]   Location [facility] 0SW-A Attending Brian Zarate,    Hosp Day # 0 PCP Zbigniew Gonzalez MD     Chief Complaint: Left flank pain    Subjective:     Patient did have an episode of left flank pain overnight, improved with oxycodone. No fever or hematuria.    Objective:    Review of Systems:   A comprehensive review of systems was completed; pertinent positive and negatives stated in subjective.    Vital signs:  Temp:  [97.4 °F (36.3 °C)-97.8 °F (36.6 °C)] 97.8 °F (36.6 °C)  Pulse:  [63-68] 68  Resp:  [16-18] 18  BP: (122-146)/(70-75) 146/75  SpO2:  [97 %-98 %] 98 %    Physical Exam:    General: No acute distress, awake and alert  Respiratory: No wheezes, no rhonchi  Cardiovascular: S1, S2, regular rate and rhythm  Abdomen: Soft, Non-tender, non-distended, positive bowel sounds  Neuro: PARKER x 4  Extremities: No edema    Diagnostic Data:    Labs:  Recent Labs   Lab 24  1524 24  2310 24  0555   WBC 5.8 6.5 7.0   HGB 12.6 13.0 12.9   MCV 82.0 82.6 84.9   .0 199.0 153.0     Recent Labs   Lab 24  1524 24  2310 24  0555   GLU 94 103* 86   BUN 22 25* 12   CREATSERUM 1.11* 0.88 0.55   CA 9.3 8.9 8.3*   ALB 3.7  --   --     136 142   K 3.4* 3.8 3.6    102 113*   CO2 29.0 32.0 25.0   ALKPHO 70  --   --    AST 23  --   --    ALT 22  --   --    BILT 0.4  --   --    TP 6.8  --   --      Estimated Creatinine Clearance: 102.3 mL/min (based on SCr of 0.55 mg/dL).    No results for input(s): \"TROP\", \"TROPHS\", \"CK\" in the last 168 hours.    No results for input(s): \"PTP\", \"INR\" in the last 168 hours.     Microbiology  No results found for this visit on 24.    Imaging: Reviewed in Epic.    Medications:    tamsulosin  0.4 mg Oral Daily       Assessment & Plan:      #Left obstructing ureteral stone  -Pain control, PRN  antiemetics  -Flomax  -Urine culture shows contamination. No fever or leukocytosis, no plan for abx currently  -Urology on consult, plan for cystoscopy tomorrow. She prefers to stay in the hospital until then. NPO at midnight. Stop IVF during the day and resume at night     #Left hydronephrosis  -Secondary to above  -Anticipate resolution with stone removal  -Cr normal      Brian Zarate,     Supplementary Documentation:     Quality:  DVT Mechanical Prophylaxis:   SCDs,    DVT Pharmacologic Prophylaxis   Medication   None     Code Status: Full Code  Thompson: No urinary catheter in place  BRIDGETTE: 1 day    Discharge is dependent on: Clinical status, urology recs  At this point Ms. Padilla is expected to be discharge to: Home    The 21st Century Cures Act makes medical notes like these available to patients in the interest of transparency. Please be advised this is a medical document. Medical documents are intended to carry relevant information, facts as evident, and the clinical opinion of the practitioner. The medical note is intended as peer to peer communication and may appear blunt or direct. It is written in medical language and may contain abbreviations or verbiage that are unfamiliar.

## 2024-05-30 NOTE — PROGRESS NOTES
[facility]   part of [facility]    Progress Note    Javi Padilla Patient Status:  Observation    1965 MRN [id number]   Location [facility] 0SW-A Attending Brian Zarate,    Hosp Day # 0 PCP Zbigniew Gonzalez MD     Subjective:   Javi Padilla is a(n) 58 year old female no overnight events, comfortable. Voiding freely, no fevers. Pain controlled.     Plan for ureteroscopy tomorrow. Urine culture contaminated.    Objective:   Blood pressure 138/84, pulse 69, temperature 98.2 °F (36.8 °C), temperature source Oral, resp. rate 16, height 4' 11\" (1.499 m), weight 128 lb (58.1 kg), last menstrual period 2011, SpO2 96%.    No distress  Non labored respirations  No Current CVAT    Results:   Lab Results   Component Value Date    WBC 7.0 2024    HGB 12.9 2024    HCT 41.5 2024    .0 2024    CREATSERUM 0.55 2024    BUN 12 2024     2024    K 3.6 2024     (H) 2024    CO2 25.0 2024    GLU 86 2024    CA 8.3 (L) 2024    ALB 3.7 2024    ALKPHO 70 2024    BILT 0.4 2024    TP 6.8 2024    AST 23 2024    ALT 22 2024    TSH 3.050 2024       No results found.        Assessment & Plan:   Ureterolithiasis  Hydronephrosis with urinary obstruction due to renal calculus  AF, HD stable  Labs stable, no RAINE or leukocytosis  Urine culture contaminated    PLAN - NPO at midnight for ureteroscopy tomorrow morning with Dr. Saldaña. Continue pain medication prn. Strain all urine. Obtain consent. John MARR.     Ana Luisa Alva PA-C  [facility]  2024

## 2024-05-30 NOTE — PLAN OF CARE
Received pt at 0100, A&Ox4. VSS. RA. Afebrile. Pt c/o flank pain, oxycodone given prn. Medications given per MAR. IVF. Call light within reach.    Problem: GENITOURINARY - ADULT  Goal: Absence of urinary retention  Description: INTERVENTIONS:  - Assess patient’s ability to void and empty bladder  - Monitor intake/output and perform bladder scan as needed  - Follow urinary retention protocol/standard of care  - Consider collaborating with pharmacy to review patient's medication profile  - Implement strategies to promote bladder emptying  Outcome: Progressing     Problem: PAIN - ADULT  Goal: Verbalizes/displays adequate comfort level or patient's stated pain goal  Description: INTERVENTIONS:  - Encourage pt to monitor pain and request assistance  - Assess pain using appropriate pain scale  - Administer analgesics based on type and severity of pain and evaluate response  - Implement non-pharmacological measures as appropriate and evaluate response  - Consider cultural and social influences on pain and pain management  - Manage/alleviate anxiety  - Utilize distraction and/or relaxation techniques  - Monitor for opioid side effects  - Notify MD/LIP if interventions unsuccessful or patient reports new pain  - Anticipate increased pain with activity and pre-medicate as appropriate  Outcome: Progressing

## 2024-05-31 ENCOUNTER — APPOINTMENT (OUTPATIENT)
Dept: GENERAL RADIOLOGY | Facility: HOSPITAL | Age: 59
End: 2024-05-31
Attending: UROLOGY
Payer: COMMERCIAL

## 2024-05-31 ENCOUNTER — TELEPHONE (OUTPATIENT)
Dept: SURGERY | Facility: CLINIC | Age: 59
End: 2024-05-31

## 2024-05-31 ENCOUNTER — ANESTHESIA (OUTPATIENT)
Dept: SURGERY | Facility: HOSPITAL | Age: 59
End: 2024-05-31
Payer: COMMERCIAL

## 2024-05-31 VITALS
SYSTOLIC BLOOD PRESSURE: 141 MMHG | OXYGEN SATURATION: 100 % | DIASTOLIC BLOOD PRESSURE: 70 MMHG | HEART RATE: 61 BPM | RESPIRATION RATE: 16 BRPM | WEIGHT: 128 LBS | BODY MASS INDEX: 25.8 KG/M2 | TEMPERATURE: 98 F | HEIGHT: 59 IN

## 2024-05-31 DIAGNOSIS — N20.1 LEFT URETERAL STONE: ICD-10-CM

## 2024-05-31 DIAGNOSIS — Z01.818 PRE-OP TESTING: Primary | ICD-10-CM

## 2024-05-31 PROCEDURE — BT1F1ZZ FLUOROSCOPY OF LEFT KIDNEY, URETER AND BLADDER USING LOW OSMOLAR CONTRAST: ICD-10-PCS | Performed by: UROLOGY

## 2024-05-31 PROCEDURE — 99239 HOSP IP/OBS DSCHRG MGMT >30: CPT | Performed by: INTERNAL MEDICINE

## 2024-05-31 PROCEDURE — 0T778DZ DILATION OF LEFT URETER WITH INTRALUMINAL DEVICE, VIA NATURAL OR ARTIFICIAL OPENING ENDOSCOPIC: ICD-10-PCS | Performed by: UROLOGY

## 2024-05-31 PROCEDURE — 74420 UROGRAPHY RTRGR +-KUB: CPT | Performed by: UROLOGY

## 2024-05-31 PROCEDURE — 99232 SBSQ HOSP IP/OBS MODERATE 35: CPT | Performed by: UROLOGY

## 2024-05-31 PROCEDURE — 52332 CYSTOSCOPY AND TREATMENT: CPT | Performed by: UROLOGY

## 2024-05-31 DEVICE — URETERAL STENT
Type: IMPLANTABLE DEVICE | Site: URETER | Status: FUNCTIONAL
Brand: ASCERTA™

## 2024-05-31 RX ORDER — DEXAMETHASONE SODIUM PHOSPHATE 4 MG/ML
VIAL (ML) INJECTION AS NEEDED
Status: DISCONTINUED | OUTPATIENT
Start: 2024-05-31 | End: 2024-05-31 | Stop reason: SURG

## 2024-05-31 RX ORDER — DROPERIDOL 2.5 MG/ML
INJECTION, SOLUTION INTRAMUSCULAR; INTRAVENOUS AS NEEDED
Status: DISCONTINUED | OUTPATIENT
Start: 2024-05-31 | End: 2024-05-31 | Stop reason: SURG

## 2024-05-31 RX ORDER — HYDROCODONE BITARTRATE AND ACETAMINOPHEN 5; 325 MG/1; MG/1
2 TABLET ORAL ONCE AS NEEDED
Status: DISCONTINUED | OUTPATIENT
Start: 2024-05-31 | End: 2024-05-31 | Stop reason: HOSPADM

## 2024-05-31 RX ORDER — OXYBUTYNIN CHLORIDE 5 MG/1
5 TABLET ORAL 3 TIMES DAILY
Qty: 90 TABLET | Refills: 0 | Status: SHIPPED | OUTPATIENT
Start: 2024-05-31

## 2024-05-31 RX ORDER — TAMSULOSIN HYDROCHLORIDE 0.4 MG/1
0.4 CAPSULE ORAL DAILY
Qty: 30 CAPSULE | Refills: 0 | Status: SHIPPED | OUTPATIENT
Start: 2024-05-31 | End: 2024-06-30

## 2024-05-31 RX ORDER — LIDOCAINE HYDROCHLORIDE 10 MG/ML
INJECTION, SOLUTION EPIDURAL; INFILTRATION; INTRACAUDAL; PERINEURAL AS NEEDED
Status: DISCONTINUED | OUTPATIENT
Start: 2024-05-31 | End: 2024-05-31 | Stop reason: SURG

## 2024-05-31 RX ORDER — MIDAZOLAM HYDROCHLORIDE 1 MG/ML
INJECTION INTRAMUSCULAR; INTRAVENOUS AS NEEDED
Status: DISCONTINUED | OUTPATIENT
Start: 2024-05-31 | End: 2024-05-31 | Stop reason: SURG

## 2024-05-31 RX ORDER — ACETAMINOPHEN 500 MG
1000 TABLET ORAL ONCE AS NEEDED
Status: DISCONTINUED | OUTPATIENT
Start: 2024-05-31 | End: 2024-05-31 | Stop reason: HOSPADM

## 2024-05-31 RX ORDER — NALOXONE HYDROCHLORIDE 0.4 MG/ML
80 INJECTION, SOLUTION INTRAMUSCULAR; INTRAVENOUS; SUBCUTANEOUS AS NEEDED
Status: DISCONTINUED | OUTPATIENT
Start: 2024-05-31 | End: 2024-05-31 | Stop reason: HOSPADM

## 2024-05-31 RX ORDER — SODIUM CHLORIDE, SODIUM LACTATE, POTASSIUM CHLORIDE, CALCIUM CHLORIDE 600; 310; 30; 20 MG/100ML; MG/100ML; MG/100ML; MG/100ML
INJECTION, SOLUTION INTRAVENOUS CONTINUOUS
Status: DISCONTINUED | OUTPATIENT
Start: 2024-05-31 | End: 2024-05-31 | Stop reason: HOSPADM

## 2024-05-31 RX ORDER — ONDANSETRON 2 MG/ML
4 INJECTION INTRAMUSCULAR; INTRAVENOUS EVERY 6 HOURS PRN
Status: DISCONTINUED | OUTPATIENT
Start: 2024-05-31 | End: 2024-05-31 | Stop reason: HOSPADM

## 2024-05-31 RX ORDER — DIPHENHYDRAMINE HYDROCHLORIDE 50 MG/ML
12.5 INJECTION INTRAMUSCULAR; INTRAVENOUS AS NEEDED
Status: DISCONTINUED | OUTPATIENT
Start: 2024-05-31 | End: 2024-05-31 | Stop reason: HOSPADM

## 2024-05-31 RX ORDER — SULFAMETHOXAZOLE AND TRIMETHOPRIM 800; 160 MG/1; MG/1
1 TABLET ORAL 2 TIMES DAILY
Qty: 6 TABLET | Refills: 0 | Status: SHIPPED | OUTPATIENT
Start: 2024-05-31

## 2024-05-31 RX ORDER — MEPERIDINE HYDROCHLORIDE 25 MG/ML
12.5 INJECTION INTRAMUSCULAR; INTRAVENOUS; SUBCUTANEOUS AS NEEDED
Status: DISCONTINUED | OUTPATIENT
Start: 2024-05-31 | End: 2024-05-31 | Stop reason: HOSPADM

## 2024-05-31 RX ORDER — HYDROMORPHONE HYDROCHLORIDE 1 MG/ML
0.2 INJECTION, SOLUTION INTRAMUSCULAR; INTRAVENOUS; SUBCUTANEOUS EVERY 5 MIN PRN
Status: DISCONTINUED | OUTPATIENT
Start: 2024-05-31 | End: 2024-05-31 | Stop reason: HOSPADM

## 2024-05-31 RX ORDER — HYDROCODONE BITARTRATE AND ACETAMINOPHEN 5; 325 MG/1; MG/1
1 TABLET ORAL ONCE AS NEEDED
Status: DISCONTINUED | OUTPATIENT
Start: 2024-05-31 | End: 2024-05-31 | Stop reason: HOSPADM

## 2024-05-31 RX ORDER — SODIUM CHLORIDE, SODIUM LACTATE, POTASSIUM CHLORIDE, CALCIUM CHLORIDE 600; 310; 30; 20 MG/100ML; MG/100ML; MG/100ML; MG/100ML
INJECTION, SOLUTION INTRAVENOUS CONTINUOUS PRN
Status: DISCONTINUED | OUTPATIENT
Start: 2024-05-31 | End: 2024-05-31 | Stop reason: SURG

## 2024-05-31 RX ORDER — HYDROMORPHONE HYDROCHLORIDE 1 MG/ML
0.6 INJECTION, SOLUTION INTRAMUSCULAR; INTRAVENOUS; SUBCUTANEOUS EVERY 5 MIN PRN
Status: DISCONTINUED | OUTPATIENT
Start: 2024-05-31 | End: 2024-05-31 | Stop reason: HOSPADM

## 2024-05-31 RX ORDER — KETOROLAC TROMETHAMINE 30 MG/ML
INJECTION, SOLUTION INTRAMUSCULAR; INTRAVENOUS AS NEEDED
Status: DISCONTINUED | OUTPATIENT
Start: 2024-05-31 | End: 2024-05-31 | Stop reason: SURG

## 2024-05-31 RX ORDER — LABETALOL HYDROCHLORIDE 5 MG/ML
5 INJECTION, SOLUTION INTRAVENOUS EVERY 5 MIN PRN
Status: DISCONTINUED | OUTPATIENT
Start: 2024-05-31 | End: 2024-05-31 | Stop reason: HOSPADM

## 2024-05-31 RX ORDER — OXYCODONE HYDROCHLORIDE 5 MG/1
5 TABLET ORAL EVERY 4 HOURS PRN
Status: DISCONTINUED | OUTPATIENT
Start: 2024-05-31 | End: 2024-05-31

## 2024-05-31 RX ORDER — OXYCODONE HYDROCHLORIDE 5 MG/1
5 TABLET ORAL EVERY 6 HOURS PRN
Qty: 15 TABLET | Refills: 0 | Status: SHIPPED | OUTPATIENT
Start: 2024-05-31

## 2024-05-31 RX ORDER — PROCHLORPERAZINE EDISYLATE 5 MG/ML
5 INJECTION INTRAMUSCULAR; INTRAVENOUS EVERY 8 HOURS PRN
Status: DISCONTINUED | OUTPATIENT
Start: 2024-05-31 | End: 2024-05-31 | Stop reason: HOSPADM

## 2024-05-31 RX ORDER — PHENYLEPHRINE HCL 10 MG/ML
VIAL (ML) INJECTION AS NEEDED
Status: DISCONTINUED | OUTPATIENT
Start: 2024-05-31 | End: 2024-05-31 | Stop reason: SURG

## 2024-05-31 RX ORDER — ONDANSETRON 2 MG/ML
INJECTION INTRAMUSCULAR; INTRAVENOUS AS NEEDED
Status: DISCONTINUED | OUTPATIENT
Start: 2024-05-31 | End: 2024-05-31 | Stop reason: SURG

## 2024-05-31 RX ORDER — PHENAZOPYRIDINE HYDROCHLORIDE 100 MG/1
100 TABLET, FILM COATED ORAL 3 TIMES DAILY PRN
Qty: 10 TABLET | Refills: 0 | Status: SHIPPED | OUTPATIENT
Start: 2024-05-31

## 2024-05-31 RX ORDER — CEFAZOLIN SODIUM 1 G/3ML
INJECTION, POWDER, FOR SOLUTION INTRAMUSCULAR; INTRAVENOUS AS NEEDED
Status: DISCONTINUED | OUTPATIENT
Start: 2024-05-31 | End: 2024-05-31 | Stop reason: SURG

## 2024-05-31 RX ORDER — HYDROMORPHONE HYDROCHLORIDE 1 MG/ML
0.4 INJECTION, SOLUTION INTRAMUSCULAR; INTRAVENOUS; SUBCUTANEOUS EVERY 5 MIN PRN
Status: DISCONTINUED | OUTPATIENT
Start: 2024-05-31 | End: 2024-05-31 | Stop reason: HOSPADM

## 2024-05-31 RX ADMIN — LIDOCAINE HYDROCHLORIDE 75 MG: 10 INJECTION, SOLUTION EPIDURAL; INFILTRATION; INTRACAUDAL; PERINEURAL at 07:14:00

## 2024-05-31 RX ADMIN — DROPERIDOL 0.62 MG: 2.5 INJECTION, SOLUTION INTRAMUSCULAR; INTRAVENOUS at 07:08:00

## 2024-05-31 RX ADMIN — DEXAMETHASONE SODIUM PHOSPHATE 8 MG: 4 MG/ML VIAL (ML) INJECTION at 07:57:00

## 2024-05-31 RX ADMIN — CEFAZOLIN SODIUM 2 G: 1 INJECTION, POWDER, FOR SOLUTION INTRAMUSCULAR; INTRAVENOUS at 07:17:00

## 2024-05-31 RX ADMIN — MIDAZOLAM HYDROCHLORIDE 2 MG: 1 INJECTION INTRAMUSCULAR; INTRAVENOUS at 07:08:00

## 2024-05-31 RX ADMIN — KETOROLAC TROMETHAMINE 30 MG: 30 INJECTION, SOLUTION INTRAMUSCULAR; INTRAVENOUS at 07:51:00

## 2024-05-31 RX ADMIN — PHENYLEPHRINE HCL 50 MCG: 10 MG/ML VIAL (ML) INJECTION at 07:30:00

## 2024-05-31 RX ADMIN — SODIUM CHLORIDE, SODIUM LACTATE, POTASSIUM CHLORIDE, CALCIUM CHLORIDE: 600; 310; 30; 20 INJECTION, SOLUTION INTRAVENOUS at 07:07:00

## 2024-05-31 RX ADMIN — ONDANSETRON 4 MG: 2 INJECTION INTRAMUSCULAR; INTRAVENOUS at 07:50:00

## 2024-05-31 NOTE — PLAN OF CARE
Pt Aox4. O2 WNL on RA. Refusing tele, EP. Straining urine. Up independently. PRN pain meds. NPO at midnight for cysto. IV abx, IVF. Pt updated on POC. Call light within reach, safety precautions in place. POC continues.    Problem: Patient/Family Goals  Goal: Patient/Family Long Term Goal  Description: Patient's Long Term Goal: go home    Interventions:  - Urology consult  -pain med  -IVF  - See additional Care Plan goals for specific interventions  Outcome: Progressing  Goal: Patient/Family Short Term Goal  Description: Patient's Short Term Goal: pain controlled    Interventions:   - pain med as needed  -cold packs  - See additional Care Plan goals for specific interventions  Outcome: Progressing     Problem: GENITOURINARY - ADULT  Goal: Absence of urinary retention  Description: INTERVENTIONS:  - Assess patient’s ability to void and empty bladder  - Monitor intake/output and perform bladder scan as needed  - Follow urinary retention protocol/standard of care  - Consider collaborating with pharmacy to review patient's medication profile  - Implement strategies to promote bladder emptying  Outcome: Progressing     Problem: PAIN - ADULT  Goal: Verbalizes/displays adequate comfort level or patient's stated pain goal  Description: INTERVENTIONS:  - Encourage pt to monitor pain and request assistance  - Assess pain using appropriate pain scale  - Administer analgesics based on type and severity of pain and evaluate response  - Implement non-pharmacological measures as appropriate and evaluate response  - Consider cultural and social influences on pain and pain management  - Manage/alleviate anxiety  - Utilize distraction and/or relaxation techniques  - Monitor for opioid side effects  - Notify MD/LIP if interventions unsuccessful or patient reports new pain  - Anticipate increased pain with activity and pre-medicate as appropriate  Outcome: Progressing

## 2024-05-31 NOTE — TELEPHONE ENCOUNTER
Please schedule this patient for surgery.    Thank you!    - urine culture 1wk prior to surgery; order placed  -  stent removal in office with MD 1 wk after surgery. This needs to be scheduled    Urology Surgery Request  Surgeon: meghann  Location (if known): [place]  Procedure: Cysto, LEFT RPG, URS, lithotripsy, stone removal, stent exchange   Anesthesia: General   Time Frame: 3-4wks  Time required: 60 minutes  Diagnosis: ureteral stone  Labs: Urine culture  Antibiotics: per hospital protocol unless checked below   ___ Levaquin 500 mg IV   ___ Gemcitabine 2 g/100 mL NS bladder instillation to be given in OR

## 2024-05-31 NOTE — OPERATIVE REPORT
[facility]   part of [facility]    Operative Note     Javi Padilla Location: OR   CSN [id number] MRN [id number]   Admission Date 5/28/2024 Operation Date 5/31/2024   Attending Physician Brian Zarate DO Operating Physician Malou Saldaña MD      Preoperative Diagnosis: Left ureterolithiasis [N20.1]     Postoperative Diagnosis: leftUreterolithiasis [N20.1]     Procedure Performed:   Cystoscopy, retrograde pyelogram, attempted ureteroscopy, balloon dilation, left stent placement     Primary Surgeon: Malou Saldaña MD      Assistant: None     Surgical Findings: Grossly unremarkable urethra and bladder, 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  Unable to place either the semirigid or the flexible lithoview ureteroscope.     Anesthesia: General     Complications: None     Implants:   Implant Name Type Inv. Item Serial No.  Lot No. LRB No. Used Action   STENT URET 6FR L22CM DIA2MM ASCERTA - SN/A  STENT URET 6FR L22CM DIA2MM ASCERTA N/A [employer] WD [id number] Left 1 Implanted        Specimen: None     Drains: None     Condition: Stable     Estimated Blood Loss: No data recorded     Summary of Case: After consent was obtained and preoperative antibiotics administered the patient was brought into the operating room.  Anesthesia was administered and the patient was placed in the dorsolithotomy position.  The groin was prepped and draped in the usual sterile standard fashion.  22 Swedish rigid cystoscope was placed per urethra.  The urethra and the bladder demonstrated no abnormalities.  I did not appreciate a radiopaque stone in the ureter or in the collecting system on the left side on intraoperative fluoroscopy.  Retrograde pyelogram was performed.  No clear filling defects were seen on the retrograde pyelogram.  A sensor wire was advanced through a 5 Swedish open-ended catheter into the collecting system on the left side.  I  dilated the ureter using a 5 Yi open-ended catheter and did not meet resistance.  An 8/10 coaxial dilator was then used to introduce a second wire and further dilate the ureter.  I attempted to place the flexible disposable ureteroscope meeting resistance about 1 to 2 cm up from the UVJ.  I tried to use the semirigid ureteroscope and met similar resistance.  A [place] catheter was also used to dilate the ureter and I am able to get this only about 4 to 5 cm up from the UVJ.  A 4 cm UroMax balloon dilating system was used to dilate the distal third of the ureter up to 12 sara.  In spite of this, the rigid ureteroscope or the flexible ureteroscope were again unable to be placed.  As such, I decided to place a stent.  A 6 Yi by 22 cm [employer] stent was placed under fluoroscopic and cystoscopic guidance.  The bladder was emptied.  The stent appeared to be in good position.  The cystoscope was removed.  The patient was placed in the supine position awakened extubated and taken out to the postanesthesia care unit stable condition.  I was present and performed the entirety of this procedure.  There were no perioperative or immediate postop complications.     Malou Saldaña MD  5/31/2024  8:00 AM

## 2024-05-31 NOTE — PROGRESS NOTES
[facility]   part of [facility]     Hospitalist Progress Note     Javi Padilla Patient Status:  Observation    1965 MRN [id number]   Location [facility] 0SW-A Attending Brian Zarate,    Hosp Day # 0 PCP Zbigniew Gonzalez MD     Chief Complaint: Left flank pain    Subjective:     Patient seen after cystoscopy. No pain.     Objective:    Review of Systems:   A comprehensive review of systems was completed; pertinent positive and negatives stated in subjective.    Vital signs:  Temp:  [97.8 °F (36.6 °C)-98.7 °F (37.1 °C)] 98.4 °F (36.9 °C)  Pulse:  [58-71] 58  Resp:  [10-19] 10  BP: (119-159)/() 125/70  SpO2:  [96 %-99 %] 98 %    Physical Exam:    General: No acute distress, awake and alert  Respiratory: No wheezes, no rhonchi  Cardiovascular: S1, S2, regular rate and rhythm  Abdomen: Soft, Non-tender, non-distended, positive bowel sounds  Neuro: PARKER x 4  Extremities: No edema    Diagnostic Data:    Labs:  Recent Labs   Lab 24  1524 24  2310 24  0555   WBC 5.8 6.5 7.0   HGB 12.6 13.0 12.9   MCV 82.0 82.6 84.9   .0 199.0 153.0     Recent Labs   Lab 24  1524 24  2310 24  0555   GLU 94 103* 86   BUN 22 25* 12   CREATSERUM 1.11* 0.88 0.55   CA 9.3 8.9 8.3*   ALB 3.7  --   --     136 142   K 3.4* 3.8 3.6    102 113*   CO2 29.0 32.0 25.0   ALKPHO 70  --   --    AST 23  --   --    ALT 22  --   --    BILT 0.4  --   --    TP 6.8  --   --      Estimated Creatinine Clearance: 102.3 mL/min (based on SCr of 0.55 mg/dL).    No results for input(s): \"TROP\", \"TROPHS\", \"CK\" in the last 168 hours.    No results for input(s): \"PTP\", \"INR\" in the last 168 hours.     Microbiology  Hospital Encounter on 24   1. Urine Culture, Routine     Status: None    Collection Time: 24 11:31 PM    Specimen: Urine, clean catch   Result Value Ref Range    Urine Culture  N/A     <10,000 cfu/ml Mixture of Gram positive organisms isolated - probable  contamination.     Imaging: Reviewed in Epic.    Medications:    tamsulosin  0.4 mg Oral Daily       Assessment & Plan:      #Left obstructing ureteral stone  -Pain control, PRN antiemetics  -Flomax  -Urine culture shows contamination. No fever or leukocytosis, no plan for abx currently  -Urology on consult, s/p cystoscopy with left ureteral stent placement today. Plan for bactrim x 3 days on discharge and definitive stone surgery in 3-4wks with stent exchange      #Left hydronephrosis  -Secondary to above  -Anticipate resolution with stone removal  -Cr normal      Brian Zarate DO    Supplementary Documentation:     Quality:  DVT Mechanical Prophylaxis:   SCDs,    DVT Pharmacologic Prophylaxis   Medication   None     Code Status: Full Code  Thompson: No urinary catheter in place  BRIDGETTE: Today    Discharge is dependent on: Clinical status, urology recs  At this point Ms. Padilla is expected to be discharge to: Home    The 21st Century Cures Act makes medical notes like these available to patients in the interest of transparency. Please be advised this is a medical document. Medical documents are intended to carry relevant information, facts as evident, and the clinical opinion of the practitioner. The medical note is intended as peer to peer communication and may appear blunt or direct. It is written in medical language and may contain abbreviations or verbiage that are unfamiliar.

## 2024-05-31 NOTE — PLAN OF CARE
Assumed care of patient at 0700.  Denies chest pain, sob, lightheadedness, dizziness.   Urology and ADRIANA Zarate MD are ok with discharge.               Problem: PAIN - ADULT  Goal: Verbalizes/displays adequate comfort level or patient's stated pain goal  Description: INTERVENTIONS:  - Encourage pt to monitor pain and request assistance  - Assess pain using appropriate pain scale  - Administer analgesics based on type and severity of pain and evaluate response  - Implement non-pharmacological measures as appropriate and evaluate response  - Consider cultural and social influences on pain and pain management  - Manage/alleviate anxiety  - Utilize distraction and/or relaxation techniques  - Monitor for opioid side effects  - Notify MD/LIP if interventions unsuccessful or patient reports new pain  - Anticipate increased pain with activity and pre-medicate as appropriate  Outcome: Progressing     Problem: GENITOURINARY - ADULT  Goal: Absence of urinary retention  Description: INTERVENTIONS:  - Assess patient’s ability to void and empty bladder  - Monitor intake/output and perform bladder scan as needed  - Follow urinary retention protocol/standard of care  - Consider collaborating with pharmacy to review patient's medication profile  - Implement strategies to promote bladder emptying  Outcome: Progressing     Problem: Patient/Family Goals  Goal: Patient/Family Long Term Goal  Description: Patient's Long Term Goal: go home    Interventions:  - Urology consult  -pain med  -IVF  - See additional Care Plan goals for specific interventions  Outcome: Progressing  Goal: Patient/Family Short Term Goal  Description: Patient's Short Term Goal: pain controlled    Interventions:   - pain med as needed  -cold packs  - See additional Care Plan goals for specific interventions  Outcome: Progressing

## 2024-05-31 NOTE — PROGRESS NOTES
[facility]   part of [facility]    Progress Note    Javi Padilla Patient Status:  Observation    1965 MRN [id number]   Location [facility] 0SW-A Attending Brian Zarate,    Hosp Day # 0 PCP Zbigniew Gonzalez MD       Subjective:   S/p cysto left URS stent placement this AM with Dr. Saldaña.     Currently no complaints. Mild urgency with urination.     Objective:   Blood pressure 125/70, pulse 58, temperature 98.4 °F (36.9 °C), resp. rate 10, height 4' 11\" (1.499 m), weight 128 lb (58.1 kg), last menstrual period 2011, SpO2 98%.    No distress  Non labored respirations  Abdomen soft, NTND    Assessment and Plan:     Ureterolithiasis  - definitive stone surgery in 3-4wks with stent exchange; our office will reach out  - discharge with bactrim x 3 days   - other meds sent for stent mgmt  - ok for discharge today    Results:     Lab Results   Component Value Date    WBC 7.0 2024    HGB 12.9 2024    HCT 41.5 2024    .0 2024    CREATSERUM 0.55 2024    BUN 12 2024     2024    K 3.6 2024     (H) 2024    CO2 25.0 2024    GLU 86 2024    CA 8.3 (L) 2024    ALB 3.7 2024    ALKPHO 70 2024    AST 23 2024    ALT 22 2024         XR OR - N/C    Result Date: 2024  CONCLUSION:  1. Fluoroscopy and technical time provided.   LOCATION:  [place]    Dictated by (CST): Brittani Scales MD on 2024 at 9:16 AM     Finalized by (CST): Brittani Scales MD on 2024 at 9:17 AM            MANUEL HarperC  2024

## 2024-05-31 NOTE — ANESTHESIA PROCEDURE NOTES
Airway  Date/Time: 5/31/2024 7:14 AM  Urgency: elective    Airway not difficult    General Information and Staff    Patient location during procedure: OR  Anesthesiologist: Jakub Sorto MD  Performed: anesthesiologist   Performed by: Jakub Sorto MD  Authorized by: Jakub Sorto MD      Indications and Patient Condition  Indications for airway management: anesthesia  Spontaneous Ventilation: absent  Sedation level: deep  Preoxygenated: yes  Patient position: sniffing  Mask difficulty assessment: 1 - vent by mask    Final Airway Details  Final airway type: supraglottic airway      Successful airway: classic  Size 3       Number of attempts at approach: 1  Number of other approaches attempted: 0

## 2024-05-31 NOTE — DISCHARGE SUMMARY
[facility]     Javi Padilla Patient Status:  Observation    1965 MRN [id number]   Location [facility] 0SW-A Attending No att. providers found   Hosp Day # 0 PCP Zbigniew Gonzalez MD     Date of Admission: 2024  Date of Discharge: 2024  Discharge Disposition: Home or Self Care    Discharge Diagnosis:   Left obstructing ureteral stone   Left hydronephrosis    History of Present Illness: Javi Padilla is a 58 year old female with no chronic medical history presents emergency room with left-sided flank pain abdominal pain.  This started yesterday patient was seen in the emergency room yesterday was diagnosed with a 5 mm left ureteral kidney stone and was sent home on Norco and Flomax patient went home and continued to have 8 out of 10 pain to the point that she felt like she was going to pass out and so she returned to the emergency room.  Patient denies any nausea or vomiting.  The pain is intermittent sharp in nature.  No exacerbating or remitting factors.  No fevers, chills, diarrhea or constipation.  No hematochezia, melena, hematuria.     Brief Synopsis: Urine culture showed contamination and abx were not started. She underwent cystoscopy with left ureteral stent placement. Plan for bactrim x 3 days postop on discharge and definitive stone surgery in 3-4 wks with stent exchange. She was discharged in stable condition.    Lace+ Score: 35  59-90 High Risk  29-58 Medium Risk  0-28   Low Risk       TCM Follow-Up Recommendation:  LACE 29-58: Moderate Risk of readmission after discharge from the hospital.    Procedures during hospitalization:   ystoscopy with left ureteral stent placement     Incidental or significant findings and recommendations (brief descriptions):  None     Lab/Test results pending at Discharge:   None    Consultants:  Urology    Discharge Medication List:     Discharge Medications        START taking these medications        Instructions  Prescription details   oxybutynin 5 MG Tabs  Commonly known as: Ditropan      Take 1 tablet (5 mg total) by mouth 3 (three) times daily.   Quantity: 90 tablet  Refills: 0     oxyCODONE 5 MG Tabs      Take 1 tablet (5 mg total) by mouth every 6 (six) hours as needed for Pain.   Quantity: 15 tablet  Refills: 0     phenazopyridine 100 MG Tabs  Commonly known as: Pyridium      Take 1 tablet (100 mg total) by mouth 3 (three) times daily as needed for Pain. This will turn your urine orange.   Quantity: 10 tablet  Refills: 0     sulfamethoxazole-trimethoprim -160 MG Tabs per tablet  Commonly known as: Bactrim DS      Take 1 tablet by mouth 2 (two) times daily.   Quantity: 6 tablet  Refills: 0            CHANGE how you take these medications        Instructions Prescription details   tamsulosin 0.4 MG Caps  Commonly known as: Flomax  What changed: additional instructions      Take 1 capsule (0.4 mg total) by mouth daily. Take 1/2 hour following the same meal each day   Stop taking on: June 30, 2024  Quantity: 30 capsule  Refills: 0            CONTINUE taking these medications        Instructions Prescription details   fluticasone propionate 50 MCG/ACT Susp  Commonly known as: Flonase      2 sprays by Nasal route as needed.   Refills: 0     ibuprofen 200 MG Tabs  Commonly known as: Motrin      Take 3 tablets (600 mg total) by mouth every 6 (six) hours as needed for Pain.   Refills: 0     Multi-Vitamin/Minerals Tabs      Take 1 tablet by mouth daily.   Refills: 0     Vitamin D 50 MCG (2000 UT) Caps      Take by mouth.   Refills: 0            STOP taking these medications      estradiol 0.1 MG/GM Crea  Commonly known as: Estrace        HYDROcodone-acetaminophen 5-325 MG Tabs  Commonly known as: Norco                  Where to Get Your Medications        These medications were sent to [id number] - [facility] [address] AT [facility] [address], [phone], [phone] [address], [facility] [address]      Hours: 24-hours Phone: [phone]   oxybutynin 5 MG Tabs  oxyCODONE 5 MG Tabs  phenazopyridine 100 MG Tabs  sulfamethoxazole-trimethoprim -160 MG Tabs per tablet  tamsulosin 0.4 MG Caps         [place]    Follow-up appointment:   Malou Saldaña MD  [address]  [address]  [facility] [id number] [phone]    Follow up in 2 week(s)  our office will call you to schedule your definitive stone surgery in 3-4 wks.    Zbigniew Gonzalez MD  [address]  [facility] [address] [phone]    Follow up in 1 week(s)      Appointments for Next 30 Days 5/31/2024 - 6/30/2024        Date Arrival Time Visit Type Length Department Provider     6/4/2024 12:40 PM  [facility] AV SHARYN [[id number]] 40 min [facility] - [facility] RM1    Patient Instructions:     Please arrive 15 minutes prior to your appointment. If you are late to your appointment, you will be rescheduled.    If breastfeeding, pump or breastfeed one hour prior to your appointment time.    Continuous glucose monitors must be removed so the appointment should be scheduled near the normal replacement date.    If you have had a mammogram within the last 5 years at a facility other than an [facility] or [facility] facility, you will need to bring those films to your appointment otherwise you will be rescheduled.    Do NOT use perfumes, deodorant, talcum powder, lotions, or creams on your breasts or underarms. They leave a coating that may be picked up by the x-rays, thereby distorting the mammogram.    Wear a two piece outfit the day of the exam. This allows you to be more comfortable during the exam.    There are no eating or activity restrictions for this exam.    The estimated duration of this exam could take up to 2 hours if an ultrasound is required. If you have questions regarding this exam, please contact a mammography technologist at [facility] at [phone] or [facility] at  [phone]..      Location Instructions:     Your appointment will be at the [facility] located at [address] [place] [address]. The facility is approximately 1/2 mile west of [address] on [address] [place] [address] at the corner of [address] [place] [address] and [facility]. Your test is in Building A, which is also labeled as the Emergency or Outpatient building. Please park in the Red Lot and follow the posted signs for guidance. The telephone number is [phone].  If any imaging exam related to this procedure has been done at a facility other than an [facility], please bring a copy of the previous films or CDs with you. If we do not have copies of your prior images (not performed at [facility] or [facility]) at the time of your exams, your results may be delayed and possibly result in rescheduling your appointment. You can also have your previous images sent to [facility] or [facility] prior to your appointment. Films may be mailed to:  [facility] Attn: Radiology Imaging Library [address] [place] [id number]  [facility] Attn: Radiology File Room [address] [place] [address]   You will be asked to fill out a history form prior to the exam.  Please bring your insurance card and photo ID. You will also need to bring your doctor's order unless your physician's office submitted the order electronically or faxed the order. Without the order, your test may be delayed or postponed.  The statements below are provided to all patients receiving an exam in our imaging department so you can be made aware of our procedures, which are designed for your safety and the best possible imaging.  Children: Children under the age of 12 must have another adult caregiver with them. Please do not bring your child/children without a caregiver. Because of the highly sensitive equipment and privacy of all our patients, children will not be permitted in  the exam rooms, unless otherwise noted and in accordance with departmental policy.  MyChart: Having an electronic MyChart account will allow you to view your medical records, information regarding appointments, as well as reports of your imaging and lab results.  PATIENT RESPONSIBILITY ESTIMATE  - (Estimate) We will provide you with your estimated remaining deductible and coinsurance balance for your services at the time of check in.  - (Payment) Please be aware that you may be asked for payment at the time of service.  Masks are optional for all patients and visitors, unless otherwise indicated.                      Vital signs:  Temp:  [97.8 °F (36.6 °C)-98.7 °F (37.1 °C)] 98.4 °F (36.9 °C)  Pulse:  [58-71] 61  Resp:  [10-19] 16  BP: (119-159)/() 141/70  SpO2:  [96 %-100 %] 100 %    Physical Exam:    See progress note dated same day.  -----------------------------------------------------------------------------------------------  PATIENT DISCHARGE INSTRUCTIONS: See electronic chart    Brian Zarate DO    Time spent:  31 minutes

## 2024-05-31 NOTE — PLAN OF CARE
Iv removed, telemetry removed, discharge instructions given with verbal understanding. Patient to be transported by wheelchair by transport.           Problem: Patient/Family Goals  Goal: Patient/Family Long Term Goal  Description: Patient's Long Term Goal: go home    Interventions:  - Urology consult  -pain med  -IVF  - See additional Care Plan goals for specific interventions  5/31/2024 1137 by Aneta Galdamez RN  Outcome: Adequate for Discharge  5/31/2024 1126 by Aneta Galdamez RN  Outcome: Progressing  Goal: Patient/Family Short Term Goal  Description: Patient's Short Term Goal: pain controlled    Interventions:   - pain med as needed  -cold packs  - See additional Care Plan goals for specific interventions  5/31/2024 1137 by Aneta Galdamez RN  Outcome: Adequate for Discharge  5/31/2024 1126 by Aneta Galdamez RN  Outcome: Progressing     Problem: GENITOURINARY - ADULT  Goal: Absence of urinary retention  Description: INTERVENTIONS:  - Assess patient’s ability to void and empty bladder  - Monitor intake/output and perform bladder scan as needed  - Follow urinary retention protocol/standard of care  - Consider collaborating with pharmacy to review patient's medication profile  - Implement strategies to promote bladder emptying  5/31/2024 1137 by Aneta Galdamez RN  Outcome: Adequate for Discharge  5/31/2024 1126 by Aneta Galdamez RN  Outcome: Progressing     Problem: PAIN - ADULT  Goal: Verbalizes/displays adequate comfort level or patient's stated pain goal  Description: INTERVENTIONS:  - Encourage pt to monitor pain and request assistance  - Assess pain using appropriate pain scale  - Administer analgesics based on type and severity of pain and evaluate response  - Implement non-pharmacological measures as appropriate and evaluate response  - Consider cultural and social influences on pain and pain management  - Manage/alleviate anxiety  - Utilize distraction and/or relaxation techniques  - Monitor for  opioid side effects  - Notify MD/LIP if interventions unsuccessful or patient reports new pain  - Anticipate increased pain with activity and pre-medicate as appropriate  5/31/2024 1137 by Aneta Galdamez, RN  Outcome: Adequate for Discharge  5/31/2024 1126 by Aneta Galdamez, RN  Outcome: Progressing

## 2024-05-31 NOTE — PROGRESS NOTES
[facility]   part of [facility]    Progress Note    Javi Padilla Patient Status:  Observation    1965 MRN [id number]   Location [facility] PRE OP HOLDING Attending Brian Zarate,    Hosp Day # 0 PCP Zbigniew Gonzalez MD       Subjective:   Javi Padilla is a(n) 58 year old female   Feels well.  No pain    Objective:   Blood pressure 159/81, pulse 63, temperature 98 °F (36.7 °C), temperature source Oral, resp. rate 19, height 4' 11\" (1.499 m), weight 128 lb (58.1 kg), last menstrual period 2011, SpO2 96%.    Abd soft, NTND    Assessment and Plan:     Ureterolithiasis  To OR for cysto, left ureteroscopy, laser lithotrispy and stent placement.  Risks and possible side effects reviewed and she understands and agrees          Results:     Lab Results   Component Value Date    WBC 7.0 2024    HGB 12.9 2024    HCT 41.5 2024    .0 2024    CREATSERUM 0.55 2024    BUN 12 2024     2024    K 3.6 2024     (H) 2024    CO2 25.0 2024    GLU 86 2024    CA 8.3 (L) 2024    ALB 3.7 2024    ALKPHO 70 2024    AST 23 2024    ALT 22 2024         No results found.        Malou Saldaña MD  2024

## 2024-05-31 NOTE — DISCHARGE INSTRUCTIONS
My office will set up an appointment for the patient to follow-up in 2 weeks for definitive ureteroscopy.  If stable, she may be discharged later this afternoon.    You had cystoscopy, ureteroscopy, and stent in the operating room today.    Instructions:    - No heavy lifting or strenuous activity for 1 day. You may resume regular activity tomorrow.  With increased activity you may notice more blood in the urine from stent movement inside the bladder.    - My surgery scheduler will reach out to to set up your next surgery in 2-4 weeks. If you do not hear from them after a few days or you need to change your appointment time or date, please contact us at [phone].    - The office will contact you to make your post-operative clinic appointment in 2 weeks. If you do not hear from the clinic after a few days or you need to change your appointment time or date, please contact us at [phone].    - Your follow-up appointment is listed below. Please contact us at [phone] if you need to change your appointment.    [unfilled]     - You have a stent (small plastic tube) inside your kidney and ureter to allow the swelling from surgery to resolve.     - With a ureteral stent in place you may have some discomfort on your side/flank. You can feel pain worsen when you urinate, so try to avoid holding urine and void every 2-3 hours. You also may notice increased blood in the urine as you increase your activity. If this happens increase your hydration and take it easy for a day. Warm baths/hot packs can help with the discomfort as well as your prescribed medications and Advil/Motrin (if you are able to take these).     - You may experience mild pain after the procedure for a few days.  If the pain becomes intolerable please contact our office or go to the nearest Emergency Room or Urgent Care. You should take over the counter ibuprofen (AKA motrin, advil) for mild pain (provided you do not have a medical condition  such as stomach ulcers or kidney disease which prohibits you from taking these). You may alternate this with tylenol as well. If pain is still not relieved by tylenol and/or ibuprofen, you may take narcotic pain medication if prescribed (typically oxycodone or tramadol). If you are taking narcotic pain medication this can make you constipated, so you should take over the counter stool softeners or miralax if prescribed.    - Warm packs over the lower abdomen or hot baths often help with discomfort after cystoscopy.    - You may experience burning and frequency of urination over the next few days. This will improve after a few days if you stay well hydrated. If you were prescribed phenazopyridine (Pyridium) this may relieve urinary discomfort but you can only take this for 3 days. Pyridium will make your urine orange.     - You are likely to see some blood in your urine (pink or light red urine) that should clear up within a few days. Staying well hydrated should help this clear up. If you notice the urine stays dark red or there are multiple large blood clots despite good hydration, please call the urology clinic ([phone]).     - Try to abstain from alcohol, coffee, tea, artificial sweeteners, and spicy food for the next 48 hours as these can irritate the bladder.     - If you develop fevers / chills, difficulty urinating, or abdominal pain that does not improve with pain medications, please call the office.     - Drink 1.5 to 2 liters of fluid today (water is preferable). If you are on a fluid restriction due to other medical reasons then you need to adhere to your fluid restriction recommendations.    MEDS FOR DISCHARGE AND WHAT THEY ARE FOR    Phenazopyridine: for burning with urination. Can take this as needed. Do not take around the clock.     Oxybuytnin: for bladder spasms. Feelings of urgency and frequency. Can take this only if you have symptoms. Side effects of constipation. Please be liberal with  stool softeners and miralax. Do not get constipated as this will make stent symptoms worse.    Tamsulosin: relaxes ureters and will help with pain. Can take this daily.     Oxycodone: narcotic pain med. Only take this if pain uncontrolled with over the counter

## 2024-06-03 ENCOUNTER — TELEPHONE (OUTPATIENT)
Dept: FAMILY MEDICINE CLINIC | Facility: CLINIC | Age: 59
End: 2024-06-03

## 2024-06-03 ENCOUNTER — PATIENT OUTREACH (OUTPATIENT)
Dept: CASE MANAGEMENT | Age: 59
End: 2024-06-03

## 2024-06-03 NOTE — TELEPHONE ENCOUNTER
Jelani Caldwell   The US shows some mild hydronephrosis per Dr Gonzalez.  This is excess fluid in a kidney that is related to a back up of urine.  He would like to know how your urinary symptoms are?  PALMA Hernandez

## 2024-06-03 NOTE — TELEPHONE ENCOUNTER
Spoke with patient, scheduled  Cystoscopy,  Left Retrograde pyelogram, Left ureteroscopy, laser lithotripsy, stone removal, stent exchange, Wednesday 06/26/2024, went over pre-op/lab instructions.    I will mail to patient' home, once received patient to call office, transfer call to [phone].

## 2024-06-06 NOTE — TELEPHONE ENCOUNTER
Spoke to patient and she had since been hospitalized for this and currently has a stent in place.  She is due soon to have the kidney stone removed

## 2024-06-11 ENCOUNTER — TELEPHONE (OUTPATIENT)
Dept: SURGERY | Facility: CLINIC | Age: 59
End: 2024-06-11

## 2024-06-11 NOTE — TELEPHONE ENCOUNTER
Patient called to discuss below.   Pt states she is taking Tamsulosin and Oxybutynin and has developed severe dry mouth and mouth sores.   Denies SOB and swelling in throat/tongue.   Recommended pt stop taking medications.   Routing to MARK Sevilla to advise.       MARK Sevilla: Please advise.

## 2024-06-11 NOTE — TELEPHONE ENCOUNTER
Per patient states oxybutynin and tamsulosin are causing sores in mouth, requesting to speak to RN. Please call thank you.

## 2024-06-12 RX ORDER — TAMSULOSIN HYDROCHLORIDE 0.4 MG/1
0.4 CAPSULE ORAL
Qty: 90 CAPSULE | Refills: 0 | OUTPATIENT
Start: 2024-06-12

## 2024-06-14 ENCOUNTER — OFFICE VISIT (OUTPATIENT)
Dept: FAMILY MEDICINE CLINIC | Facility: CLINIC | Age: 59
End: 2024-06-14
Payer: COMMERCIAL

## 2024-06-14 ENCOUNTER — HOSPITAL ENCOUNTER (EMERGENCY)
Age: 59
Discharge: HOME | End: 2024-06-14
Attending: EMERGENCY MEDICINE

## 2024-06-14 VITALS
DIASTOLIC BLOOD PRESSURE: 80 MMHG | SYSTOLIC BLOOD PRESSURE: 136 MMHG | WEIGHT: 128 LBS | TEMPERATURE: 98 F | HEIGHT: 59 IN | HEART RATE: 59 BPM | OXYGEN SATURATION: 98 % | RESPIRATION RATE: 18 BRPM | BODY MASS INDEX: 25.8 KG/M2

## 2024-06-14 VITALS
SYSTOLIC BLOOD PRESSURE: 136 MMHG | HEIGHT: 59 IN | WEIGHT: 125 LBS | BODY MASS INDEX: 25.2 KG/M2 | DIASTOLIC BLOOD PRESSURE: 88 MMHG | HEART RATE: 88 BPM | RESPIRATION RATE: 16 BRPM | OXYGEN SATURATION: 99 % | TEMPERATURE: 97 F

## 2024-06-14 DIAGNOSIS — R42 DIZZINESS: Primary | ICD-10-CM

## 2024-06-14 DIAGNOSIS — R42 VERTIGO: Primary | ICD-10-CM

## 2024-06-14 LAB
ANION GAP SERPL CALC-SCNC: 5 MMOL/L (ref 0–18)
BASOPHILS # BLD AUTO: 0.07 X10(3) UL (ref 0–0.2)
BASOPHILS NFR BLD AUTO: 1.4 %
BUN BLD-MCNC: 14 MG/DL (ref 9–23)
CALCIUM BLD-MCNC: 8.8 MG/DL (ref 8.5–10.1)
CHLORIDE SERPL-SCNC: 106 MMOL/L (ref 98–112)
CO2 SERPL-SCNC: 29 MMOL/L (ref 21–32)
CREAT BLD-MCNC: 0.67 MG/DL
EGFRCR SERPLBLD CKD-EPI 2021: 101 ML/MIN/1.73M2 (ref 60–?)
EOSINOPHIL # BLD AUTO: 0.17 X10(3) UL (ref 0–0.7)
EOSINOPHIL NFR BLD AUTO: 3.5 %
ERYTHROCYTE [DISTWIDTH] IN BLOOD BY AUTOMATED COUNT: 15.1 %
GLUCOSE BLD-MCNC: 85 MG/DL (ref 70–99)
HCT VFR BLD AUTO: 38.6 %
HGB BLD-MCNC: 12.5 G/DL
IMM GRANULOCYTES # BLD AUTO: 0.01 X10(3) UL (ref 0–1)
IMM GRANULOCYTES NFR BLD: 0.2 %
LYMPHOCYTES # BLD AUTO: 1.19 X10(3) UL (ref 1–4)
LYMPHOCYTES NFR BLD AUTO: 24.5 %
MCH RBC QN AUTO: 27.1 PG (ref 26–34)
MCHC RBC AUTO-ENTMCNC: 32.4 G/DL (ref 31–37)
MCV RBC AUTO: 83.5 FL
MONOCYTES # BLD AUTO: 0.48 X10(3) UL (ref 0.1–1)
MONOCYTES NFR BLD AUTO: 9.9 %
NEUTROPHILS # BLD AUTO: 2.93 X10 (3) UL (ref 1.5–7.7)
NEUTROPHILS # BLD AUTO: 2.93 X10(3) UL (ref 1.5–7.7)
NEUTROPHILS NFR BLD AUTO: 60.5 %
OSMOLALITY SERPL CALC.SUM OF ELEC: 290 MOSM/KG (ref 275–295)
PLATELET # BLD AUTO: 247 10(3)UL (ref 150–450)
POTASSIUM SERPL-SCNC: 3.3 MMOL/L (ref 3.5–5.1)
RBC # BLD AUTO: 4.62 X10(6)UL
SODIUM SERPL-SCNC: 140 MMOL/L (ref 136–145)
WBC # BLD AUTO: 4.9 X10(3) UL (ref 4–11)

## 2024-06-14 PROCEDURE — 93005 ELECTROCARDIOGRAM TRACING: CPT

## 2024-06-14 PROCEDURE — 85025 COMPLETE CBC W/AUTO DIFF WBC: CPT | Performed by: EMERGENCY MEDICINE

## 2024-06-14 PROCEDURE — 80048 BASIC METABOLIC PNL TOTAL CA: CPT | Performed by: EMERGENCY MEDICINE

## 2024-06-14 PROCEDURE — 99284 EMERGENCY DEPT VISIT MOD MDM: CPT

## 2024-06-14 PROCEDURE — 96361 HYDRATE IV INFUSION ADD-ON: CPT

## 2024-06-14 PROCEDURE — 93010 ELECTROCARDIOGRAM REPORT: CPT

## 2024-06-14 PROCEDURE — 96360 HYDRATION IV INFUSION INIT: CPT

## 2024-06-14 RX ORDER — DIAZEPAM 5 MG/1
5 TABLET ORAL ONCE
Status: COMPLETED | OUTPATIENT
Start: 2024-06-14 | End: 2024-06-14

## 2024-06-14 RX ORDER — SODIUM CHLORIDE 9 MG/ML
125 INJECTION, SOLUTION INTRAVENOUS CONTINUOUS
Status: DISCONTINUED | OUTPATIENT
Start: 2024-06-14 | End: 2024-06-14

## 2024-06-14 RX ORDER — DIAZEPAM 2 MG/1
2 TABLET ORAL 3 TIMES DAILY PRN
Qty: 15 TABLET | Refills: 0 | Status: SHIPPED | OUTPATIENT
Start: 2024-06-14 | End: 2024-06-29

## 2024-06-14 NOTE — PROGRESS NOTES
Pt presented with dizziness for past 3 days.  Reports aural fullness.  Worse with position changes.  Hx of vertigo in the past.  Reports she's tried Flonase and Meclizine with no improvement.  CN 2-12 intact.  + Romberg.    /80   Pulse 59   Temp 97.9 °F (36.6 °C) (Oral)   Resp 18   Ht 4' 11\" (1.499 m)   Wt 128 lb (58.1 kg)   LMP 03/05/2011   SpO2 98%   BMI 25.85 kg/m²     Accompanied by: son  After triage, a higher level of care was recommended to pt.  Discussed limitations of WIC and need for further evaluation due to further evaluation and treatment needed.  Referred to: IC or ED, patient prefers ED  Patient verbalized understanding of rationale for further evaluation and was stable upon discharge.

## 2024-06-14 NOTE — ED PROVIDER NOTES
Patient Seen in: [facility] In [place]      History     Chief Complaint   Patient presents with    Dizziness     Stated Complaint: vertirgo x 3 days  history of vertigo    Subjective:   HPI    This is a 58-year-old female past medical history of GERD, vertigo who presents with dizziness for 3 days.  Patient states she has a past medical history of vertigo and has seen ENT in the past.  She states is very positional she goes from sitting to standing or turns in bed.  That is what aggravates it.  She took some over-the-counter Dramamine which did not seem to help much.  She has had some nausea but no vomiting..  She denies any headache, sore weakness.  She has had a recent history of kidney stone with stent placed and is due for lithotripsy.  She denies any fevers or dysuria.  She was driven here by family.  She presents for further evaluation.                        Objective:   No pertinent past medical history.            No pertinent past surgical history.              No pertinent social history.            Review of Systems    Positive for stated complaint: vertirgo x 3 days  history of vertigo  Other systems are as noted in HPI.  Constitutional and vital signs reviewed.      All other systems reviewed and negative except as noted above.    Physical Exam     ED Triage Vitals [06/14/24 0938]   /82   Pulse 63   Resp 16   Temp 97.4 °F (36.3 °C)   Temp src Temporal   SpO2 100 %   O2 Device None (Room air)       Current Vitals:   Vital Signs  BP: (!) 137/91  Pulse: 62  Resp: 14  Temp: 97.4 °F (36.3 °C)  Temp src: Temporal    Oxygen Therapy  SpO2: 99 %  O2 Device: None (Room air)            Physical Exam    GENERAL: Awake, alert oriented x3, nontoxic appearing.   SKIN: Normal, warm, and dry.  HEENT:  Pupils equally round and reactive to light. Conjuctiva clear.  Horizontal nystagmus with fast beating component to right.  Resolves in seconds.  Right TM is clear and intact.  There is a small  effusion behind her left TM.  Oropharynx is clear and moist.   Lungs: Clear to auscultation bilaterally with no rales, no retractions, and no wheezing.  HEART:  Regular rate and rhythm. S1 and S2. No murmurs, no rubs or gallops.   ABDOMEN: Soft, nontender and nondistended. Normoactive bowel sounds. No rebound. No guarding.   EXTREMITIES: Warm with brisk capillary refill.   Neuro: Speech clear. No facial asymmetry. No pronator drift. Motor strength 5 out of 5 in upper and lower extremities. Patient can ambulate with steady gait.         ED Course     Labs Reviewed   BASIC METABOLIC PANEL (8) - Abnormal; Notable for the following components:       Result Value    Potassium 3.3 (*)     All other components within normal limits   CBC WITH DIFFERENTIAL WITH PLATELET    Narrative:     The following orders were created for panel order CBC With Differential With Platelet.  Procedure                               Abnormality         Status                     ---------                               -----------         ------                     CBC W/ DIFFERENTIAL[[id number]]                              Final result                 Please view results for these tests on the individual orders.   CBC W/ DIFFERENTIAL     EKG    Rate, intervals and axes as noted on EKG Report.  Rate: 66  Rhythm: Sinus Rhythm  Reading: No acute changes                          MDM          This is a 58-year-old female past medical history of GERD, vertigo who presents with dizziness for 3 days.  Differential includes vertigo, viral syndrome.    IV line was established of normal saline.  She was given a 1 L bolus.  Basic labs were obtained.  CBC: White blood cell count 4.9.  Hemoglobin 12.5 platelet 247.  BMP: BUN 14.  Count 0.6 per glucose 85.  Bicarb 29.  Potassium 3.3.        Patient was given Valium 5 mg orally.  She states she feels much better.  Her symptoms are nearly resolved.    I discussed with her to continue her Flonase.  She does have  fluid behind left ear.  Valium as needed 2-3 times daily.  Change positions slowly.  Do not participate in any activity when she can harm or self by on Valium.  Avoid alcohol          Patient driven home by family member.  Encouraged to follow-up with primary care and neurology.  Continue Valium as needed.  Change positions slowly.  Return if worse.  Patient discharged home in good condition.      Disposition and Plan     Clinical Impression:  1. Vertigo         Disposition:  Discharge  6/14/2024 12:09 pm    Follow-up:  Zbigniew Gonzalez MD  [address]  [facility] [address] [phone]    Follow up on 6/17/2024      Kayden Jorge DO  [address]  [address]  [facility] [id number] [phone]    Follow up            Medications Prescribed:  Current Discharge Medication List        START taking these medications    Details   diazePAM 2 MG Oral Tab Take 1 tablet (2 mg total) by mouth 3 (three) times daily as needed for Anxiety.  Qty: 15 tablet, Refills: 0    Associated Diagnoses: Vertigo

## 2024-06-14 NOTE — TELEPHONE ENCOUNTER
I s/w pt and informed her of the resp. Msg as stated below and she stated that she is much better now that she is off the med but has now developed Vertigo and is on her way to the urgent care. She also asked about when to submit her urine sample and I told her 4 to 7 days before.        Di Vizcaino PA-C Davey, Briana, RN3 days ago     She can stop meds for now. If having worsened stent pain after stopping meds then would recommend pain meds like ibuprofen, tylenol, and toradol (can send this for her if she needs). Can also do heat pack. In the meantime push lots of fluids until surgery.    Dry mouth shouldn't cause the sores? Maybe follow up with primary for this.    If no changes in symptoms then can stop indefinitely.

## 2024-06-14 NOTE — ED QUICK NOTES
Patient states that when she ambulated to the bathroom her dizziness is much better. Patient resting comfortably on cart.

## 2024-06-14 NOTE — DISCHARGE INSTRUCTIONS
Change positions slowly  Do not drink alcohol  Do not do any heavy physical activity  Valium as prescribed  Return if worse  Follow-up with primary care neurology next week

## 2024-06-17 ENCOUNTER — LAB ENCOUNTER (OUTPATIENT)
Dept: LAB | Age: 59
End: 2024-06-17
Attending: FAMILY MEDICINE

## 2024-06-17 LAB
ATRIAL RATE: 66 BPM
P AXIS: 34 DEGREES
P-R INTERVAL: 140 MS
Q-T INTERVAL: 446 MS
QRS DURATION: 82 MS
QTC CALCULATION (BEZET): 467 MS
R AXIS: 53 DEGREES
T AXIS: 74 DEGREES
VENTRICULAR RATE: 66 BPM

## 2024-06-21 ENCOUNTER — TELEPHONE (OUTPATIENT)
Dept: SURGERY | Facility: CLINIC | Age: 59
End: 2024-06-21

## 2024-06-21 RX ORDER — SULFAMETHOXAZOLE AND TRIMETHOPRIM 800; 160 MG/1; MG/1
1 TABLET ORAL 2 TIMES DAILY
Qty: 14 TABLET | Refills: 0 | Status: SHIPPED | OUTPATIENT
Start: 2024-06-21 | End: 2024-06-28

## 2024-06-21 NOTE — TELEPHONE ENCOUNTER
Patient calling as they received a message indicating their urine culture came back abnormal. Patient states they were recommended to start antibiotics and would like to know if they can be prescribed. Patient would like for medication to go to [facility] in [place]. Patient asked not to be messaged on [employer] as they don't have access and to be called. Please advise.

## 2024-06-21 NOTE — TELEPHONE ENCOUNTER
Culture seems contaminated. Pt unsure if she has an infxn. Maybe sx occasionally. Given upcoming surgery, will send bactrim prophylactically.

## 2024-06-26 ENCOUNTER — ANESTHESIA EVENT (OUTPATIENT)
Dept: SURGERY | Facility: HOSPITAL | Age: 59
End: 2024-06-26

## 2024-06-26 ENCOUNTER — HOSPITAL ENCOUNTER (OUTPATIENT)
Facility: HOSPITAL | Age: 59
Setting detail: HOSPITAL OUTPATIENT SURGERY
Discharge: HOME | End: 2024-06-26
Attending: UROLOGY | Admitting: UROLOGY

## 2024-06-26 ENCOUNTER — TELEPHONE (OUTPATIENT)
Dept: SURGERY | Facility: CLINIC | Age: 59
End: 2024-06-26

## 2024-06-26 ENCOUNTER — APPOINTMENT (OUTPATIENT)
Dept: GENERAL RADIOLOGY | Facility: HOSPITAL | Age: 59
End: 2024-06-26
Attending: NEUROLOGICAL SURGERY

## 2024-06-26 ENCOUNTER — ANESTHESIA (OUTPATIENT)
Dept: SURGERY | Facility: HOSPITAL | Age: 59
End: 2024-06-26

## 2024-06-26 VITALS
HEIGHT: 59 IN | HEART RATE: 65 BPM | OXYGEN SATURATION: 100 % | DIASTOLIC BLOOD PRESSURE: 75 MMHG | RESPIRATION RATE: 16 BRPM | TEMPERATURE: 98 F | SYSTOLIC BLOOD PRESSURE: 116 MMHG | BODY MASS INDEX: 24.8 KG/M2 | WEIGHT: 123 LBS

## 2024-06-26 PROCEDURE — 74420 UROGRAPHY RTRGR +-KUB: CPT | Performed by: UROLOGY

## 2024-06-26 PROCEDURE — 0T778DZ DILATION OF LEFT URETER WITH INTRALUMINAL DEVICE, VIA NATURAL OR ARTIFICIAL OPENING ENDOSCOPIC: ICD-10-PCS | Performed by: UROLOGY

## 2024-06-26 PROCEDURE — 52356 CYSTO/URETERO W/LITHOTRIPSY: CPT | Performed by: UROLOGY

## 2024-06-26 PROCEDURE — BT1FZZZ FLUOROSCOPY OF LEFT KIDNEY, URETER AND BLADDER: ICD-10-PCS | Performed by: UROLOGY

## 2024-06-26 PROCEDURE — 0TC78ZZ EXTIRPATION OF MATTER FROM LEFT URETER, VIA NATURAL OR ARTIFICIAL OPENING ENDOSCOPIC: ICD-10-PCS | Performed by: UROLOGY

## 2024-06-26 DEVICE — URETERAL STENT
Type: IMPLANTABLE DEVICE | Site: BLADDER | Status: FUNCTIONAL
Brand: ASCERTA™

## 2024-06-26 RX ORDER — LIDOCAINE HYDROCHLORIDE 20 MG/ML
INJECTION, SOLUTION EPIDURAL; INFILTRATION; INTRACAUDAL; PERINEURAL AS NEEDED
Status: DISCONTINUED | OUTPATIENT
Start: 2024-06-26 | End: 2024-06-26 | Stop reason: SURG

## 2024-06-26 RX ORDER — KETOROLAC TROMETHAMINE 30 MG/ML
INJECTION, SOLUTION INTRAMUSCULAR; INTRAVENOUS AS NEEDED
Status: DISCONTINUED | OUTPATIENT
Start: 2024-06-26 | End: 2024-06-26 | Stop reason: SURG

## 2024-06-26 RX ORDER — NALOXONE HYDROCHLORIDE 0.4 MG/ML
0.08 INJECTION, SOLUTION INTRAMUSCULAR; INTRAVENOUS; SUBCUTANEOUS AS NEEDED
Status: DISCONTINUED | OUTPATIENT
Start: 2024-06-26 | End: 2024-06-26

## 2024-06-26 RX ORDER — SODIUM CHLORIDE, SODIUM LACTATE, POTASSIUM CHLORIDE, CALCIUM CHLORIDE 600; 310; 30; 20 MG/100ML; MG/100ML; MG/100ML; MG/100ML
INJECTION, SOLUTION INTRAVENOUS CONTINUOUS
Status: DISCONTINUED | OUTPATIENT
Start: 2024-06-26 | End: 2024-06-26

## 2024-06-26 RX ORDER — CEFADROXIL 500 MG/1
500 CAPSULE ORAL 2 TIMES DAILY
Qty: 6 CAPSULE | Refills: 0 | Status: SHIPPED | OUTPATIENT
Start: 2024-06-26 | End: 2024-06-29

## 2024-06-26 RX ORDER — ACETAMINOPHEN 500 MG
1000 TABLET ORAL ONCE
Status: COMPLETED | OUTPATIENT
Start: 2024-06-26 | End: 2024-06-26

## 2024-06-26 RX ORDER — DEXAMETHASONE SODIUM PHOSPHATE 4 MG/ML
VIAL (ML) INJECTION AS NEEDED
Status: DISCONTINUED | OUTPATIENT
Start: 2024-06-26 | End: 2024-06-26 | Stop reason: SURG

## 2024-06-26 RX ORDER — GLYCOPYRROLATE 0.2 MG/ML
INJECTION, SOLUTION INTRAMUSCULAR; INTRAVENOUS AS NEEDED
Status: DISCONTINUED | OUTPATIENT
Start: 2024-06-26 | End: 2024-06-26 | Stop reason: SURG

## 2024-06-26 RX ORDER — ONDANSETRON 2 MG/ML
INJECTION INTRAMUSCULAR; INTRAVENOUS AS NEEDED
Status: DISCONTINUED | OUTPATIENT
Start: 2024-06-26 | End: 2024-06-26 | Stop reason: SURG

## 2024-06-26 RX ORDER — PHENAZOPYRIDINE HYDROCHLORIDE 200 MG/1
200 TABLET, FILM COATED ORAL ONCE
Status: COMPLETED | OUTPATIENT
Start: 2024-06-26 | End: 2024-06-26

## 2024-06-26 RX ADMIN — KETOROLAC TROMETHAMINE 15 MG: 30 INJECTION, SOLUTION INTRAMUSCULAR; INTRAVENOUS at 09:18:00

## 2024-06-26 RX ADMIN — SODIUM CHLORIDE, SODIUM LACTATE, POTASSIUM CHLORIDE, CALCIUM CHLORIDE: 600; 310; 30; 20 INJECTION, SOLUTION INTRAVENOUS at 08:43:00

## 2024-06-26 RX ADMIN — GLYCOPYRROLATE 0.2 MG: 0.2 INJECTION, SOLUTION INTRAMUSCULAR; INTRAVENOUS at 08:46:00

## 2024-06-26 RX ADMIN — LIDOCAINE HYDROCHLORIDE 40 MG: 20 INJECTION, SOLUTION EPIDURAL; INFILTRATION; INTRACAUDAL; PERINEURAL at 08:46:00

## 2024-06-26 RX ADMIN — SODIUM CHLORIDE, SODIUM LACTATE, POTASSIUM CHLORIDE, CALCIUM CHLORIDE: 600; 310; 30; 20 INJECTION, SOLUTION INTRAVENOUS at 09:21:00

## 2024-06-26 RX ADMIN — DEXAMETHASONE SODIUM PHOSPHATE 4 MG: 4 MG/ML VIAL (ML) INJECTION at 08:48:00

## 2024-06-26 RX ADMIN — ONDANSETRON 4 MG: 2 INJECTION INTRAMUSCULAR; INTRAVENOUS at 08:48:00

## 2024-06-26 NOTE — ANESTHESIA PROCEDURE NOTES
Airway  Date/Time: 6/26/2024 8:48 AM  Airway not difficult    General Information and Staff    Patient location during procedure: OR  Resident/CRNA: Christine Deng CRNA  Performed: CRNA   Performed by: Christine Deng CRNA  Authorized by: Christine Deng CRNA      Indications and Patient Condition  Indications for airway management: anesthesia  Sedation level: deep  Preoxygenated: yes  MILS maintained throughout  Mask difficulty assessment: 1 - vent by mask    Final Airway Details  Final airway type: supraglottic airway      Successful airway: classic  Size 3      Additional Comments  Easy, atraumatic placement of LMA. Dentition, lips and mucosa unchanged.

## 2024-06-26 NOTE — TELEPHONE ENCOUNTER
Jelani Orosco or Di,  This patient had a ureteroscopy today for a proximal stone.  She is on a peripheral patient.  I placed a stent on May 31 but could not do her ureteroscopy at the time because of some natural stenosis of the ureter.  I successfully broke up the stone and dusted today.  I placed her stent on threads and advised her to remove it on Friday.  She will need to see 1 of you guys in the office in 1 month in [place].  I asked her to obtain an ultrasound of the kidneys 1 to 2 days prior to the appointment.  Please facilitate an appointment for her to see you.

## 2024-06-26 NOTE — ANESTHESIA POSTPROCEDURE EVALUATION
Patient: Javi Padilla    Procedure Summary       Date: 06/26/24 Room / Location: [facility] MAIN OR  / [facility] MAIN OR    Anesthesia Start: 0843 Anesthesia Stop:     Procedures:       Cystoscopy, left retrograde pyelogram, left ureteroscopy, laser lithotripsy, stent exchange (Left: Urethra)      CYSTOSCOPY URETEROSCOPY (Left: Urethra)      LASER HOLMIUM LITHOTRIPSY (Left: Urethra)      CYSTOSCOPY STENT INSERTION (Left: Urethra) Diagnosis:       Left ureteral stone      (Left ureteral stone [N20.1])    Surgeons: Malou Saldaña MD Anesthesiologist: Christine Deng CRNA    Anesthesia Type: general ASA Status: 2            Anesthesia Type: general    Vitals Value Taken Time   /87 06/26/24 0933   Temp 97.2 °F (36.2 °C) 06/26/24 0933   Pulse 76 06/26/24 0933   Resp 11 06/26/24 0933   SpO2 100 % 06/26/24 0933   Vitals shown include unfiled device data.    [facility] AN Post Evaluation:   Patient Evaluated in PACU  Patient Participation: complete - patient participated  Level of Consciousness: awake and alert  Pain Score: 0  Pain Management: adequate  Airway Patency:patent  Dental exam unchanged from preop  Yes    Nausea/Vomiting: none  Cardiovascular Status: acceptable  Respiratory Status: acceptable  Postoperative Hydration acceptable    Christine Deng CRNA  6/26/2024 9:33 AM

## 2024-06-26 NOTE — INTERVAL H&P NOTE
Pre-op Diagnosis: Left ureteral stone [N20.1]    The above referenced H&P was reviewed by Malou Saldaña MD on 6/26/2024, the patient was examined and no significant changes have occurred in the patient's condition since the H&P was performed.  I discussed with the patient and/or legal representative the potential benefits, risks and side effects of this procedure; the likelihood of the patient achieving goals; and potential problems that might occur during recuperation.  I discussed reasonable alternatives to the procedure, including risks, benefits and side effects related to the alternatives and risks related to not receiving this procedure.  We will proceed with procedure as planned.

## 2024-06-26 NOTE — H&P
Reason for Consultation:  Obstructing left ureteral stone     History of Present Illness:  Javi Padilla is a a(n) 58 year old female with hx of chronic back pain, who presented to the ED last night for recurrence of left flank pain. She had been diagnosed with left ureteral stone  at [place] [facility] and discharged home on Norco and Flomax. Unfortunately she returned because pain returned and Norco did not relieve adequately. She was afebrile and hemodynamically stable at time of presentation. Labs showed normal renal function with serum creatinine 0.88 and normal white count. Urinalysis 3-5 RBC/hpf, rare bacteria. She was admitted for further evaluation and mgmt. Urology consulted.     Patient notes that is her first stone. Was seen the end of April with pelvic pain and pressure with some associated urinary complaints. Treated empirically for UTI, final culture mixed genital kandace. At same time was being evaluated for vaginal bleeding and had pelvic ultrasound. Renal ultrasound completed as ordered by PCP 24 that showed mild left hydronephrosis.      Typically drinks good volumes of fluids, but not a particular water drinker. Last received Morphine at 2am.      History:  Past Medical History       Past Medical History:    Back pain, chronic    COVID-19    COVID-19    Cyst of joint of shoulder     RIGHT    Esophageal reflux    Hx of motion sickness    Irregular periods/menstrual cycles     started 3 years ago    PONV (postoperative nausea and vomiting)     nauseous during epidural    Visual impairment     GLASSES         Past Surgical History         Past Surgical History:   Procedure Laterality Date    Appendectomy         age 16    Appendectomy                 delivery only        Colonoscopy N/A 2023     Procedure: COLONOSCOPY;  Surgeon: Radhames Kaiser DO;  Location:  ENDOSCOPY         Family History         Family History   Problem Relation Age of Onset    Other (colon cancer)  Father      Other (prostate cancer) Father      Colon Cancer Father      Heart Disease Mother      Diabetes Brother      Other (prostate cancer) Brother            reports that she has quit smoking. Her smoking use included cigarettes. She has never used smokeless tobacco. She reports that she does not drink alcohol and does not use drugs.     Allergies:  Allergies        Allergies   Allergen Reactions    Amoxicillin UNKNOWN            Medications:    Current Hospital Medications      Current Facility-Administered Medications:     tamsulosin (Flomax) cap 0.4 mg, 0.4 mg, Oral, Daily    melatonin tab 3 mg, 3 mg, Oral, Nightly PRN    sodium chloride 0.9% infusion, , Intravenous, Continuous    morphINE PF 4 MG/ML injection 1 mg, 1 mg, Intravenous, Q2H PRN **OR** morphINE PF 4 MG/ML injection 2 mg, 2 mg, Intravenous, Q2H PRN **OR** morphINE PF 4 MG/ML injection 4 mg, 4 mg, Intravenous, Q2H PRN    ondansetron (Zofran) 4 MG/2ML injection 4 mg, 4 mg, Intravenous, Q6H PRN    prochlorperazine (Compazine) 10 MG/2ML injection 5 mg, 5 mg, Intravenous, Q8H PRN    fluticasone propionate (Flonase) 50 MCG/ACT nasal suspension 1 spray, 1 spray, Each Nare, Daily PRN    acetaminophen (Tylenol Extra Strength) tab 500 mg, 500 mg, Oral, Q6H PRN    oxyCODONE immediate release tab 5 mg, 5 mg, Oral, Q4H PRN        Review of Systems:  Pertinent items are noted in HPI.     Physical Exam:  /70 (BP Location: Left arm)   Pulse 63   Temp 97.4 °F (36.3 °C) (Oral)   Resp 16   Ht 4' 11\" (1.499 m)   Wt 128 lb (58.1 kg)   LMP 03/05/2011   SpO2 97%   BMI 25.85 kg/m²   General appearance: alert, appears stated age, cooperative, and no distress  Head: Normocephalic, without obvious abnormality, atraumatic  Back:  mild L CVAT  Lungs: non labored respirations  Abdomen: soft, NTND  Neurologic: Grossly normal  Psych appropriate affect and mood     Laboratory Data:        Lab Results   Component Value Date     WBC 6.5 05/28/2024     HGB 13.0  05/28/2024     HCT 40.3 05/28/2024     .0 05/28/2024     CREATSERUM 0.88 05/28/2024     BUN 25 05/28/2024      05/28/2024     K 3.8 05/28/2024      05/28/2024     CO2 32.0 05/28/2024      05/28/2024     CA 8.9 05/28/2024         Urinalysis Results (last three years):       Recent Labs     05/27/24  1536 05/28/24  2331   COLORUR Yellow Yellow   CLARITY Clear Clear   SPECGRAVITY <=1.005 1.015   PHURINE 5.0 7.0   PROUR Negative Negative   GLUUR Negative Negative   KETUR Negative Negative   BILUR Negative Negative   BLOODURINE Trace-Intact* Small*   NITRITE Negative Negative   UROBILINOGEN 0.2 0.2   LEUUR Negative Trace*   WBCUR None Seen 1-5  1-5   RBCUR None Seen 3-5*  3-5*   BACUR 1+* Rare*  Rare*         Urine Culture Results (last three years):  No results found for: \"URINECUL\"     Imaging  CT ABDOMEN+PELVIS KIDNEYSTONE 2D RNDR(NO IV,NO ORAL)(CPT=74176)     Result Date: 5/27/2024  PROCEDURE:  CT ABDOMEN+PELVIS KIDNEYSTONE 2D RNDR(NO IV,NO ORAL)(CPT=74176)  COMPARISON:  None.  INDICATIONS:  Left flank pain x 3 days  TECHNIQUE:  Unenhanced multislice CT scanning from above the kidneys to below the urinary bladder.  2D rendering are generated on the CT scanner workstation to localize potential stones in the cranio-caudal plane.  Dose reduction techniques were used. Dose information is transmitted to the ACR (American College of Radiology) NRDR (National Radiology Data Registry) which includes the Dose Index Registry.  PATIENT STATED HISTORY: (As transcribed by Technologist)  Lt flank pain for 3 days.    FINDINGS:  KIDNEYS:  There is a 5 mm obstructing calculus within the left proximal ureter with mild to moderate hydronephrosis.  No right nephrolithiasis or hydronephrosis. BLADDER:  No mass, calculus or significant wall thickening. ADRENALS:  No mass or enlargement.  LIVER:  No enlargement, atrophy, abnormal density, or significant focal lesion.  BILIARY:  No visible dilatation or  calcification.  PANCREAS:  No lesion, fluid collection, ductal dilatation, or atrophy.  SPLEEN:  No enlargement or focal lesion.  AORTA/VASCULAR:  No aneurysm.  RETROPERITONEUM:  No mass or adenopathy.  BOWEL/MESENTERY:  No visible mass, obstruction, or bowel wall thickening.  ABDOMINAL WALL:  No mass or hernia.  BONES:  No bony lesion or fracture. PELVIC ORGANS:  Normal for age.  LUNG BASES:  Mild atelectasis. OTHER:  Negative.              CONCLUSION:  5 mm obstructing calculus within the left proximal ureter with mild to moderate upstream hydronephrosis.    LOCATION:  [facility]   Dictated by (CST): Elliot Boyd MD on 5/27/2024 at 4:39 PM     Finalized by (CST): Elliot Boyd MD on 5/27/2024 at 4:41 PM        US KIDNEY/BLADDER (CPT=76770)     Result Date: 5/23/2024  PROCEDURE:  US KIDNEY/BLADDER (CPT=76770)  COMPARISON:  None.  INDICATIONS:  R39.9 UTI symptoms  TECHNIQUE:  Transabdominal gray scale ultrasound imaging of the bilateral kidneys and bladder was performed.  Routine technique was utilized.   PATIENT STATED HISTORY: (As transcribed by Technologist)     FINDINGS:   RIGHT KIDNEY MEASUREMENTS:  Measures 10.6 x 3.1 x 4.3 cm. ECHOGENICITY:  Normal. HYDRONEPHROSIS:  No hydronephrosis.  An extrarenal pelvis on the right is noted. CYSTS/STONES/MASSES:  None.  LEFT KIDNEY MEASUREMENTS:  Measures 10 x 3.9 x 4.4 cm. ECHOGENICITY:  Normal. HYDRONEPHROSIS:  Mild left-sided hydronephrosis. CYSTS/STONES/MASSES:  None.  BLADDER:  Normal. OTHER:  Negative.             CONCLUSION:  1. Mild left-sided hydronephrosis.  The bladder is unremarkable.  No renal mass noted bilaterally.    LOCATION:  [id number]     Dictated by (CST): Hema Chan MD on 5/23/2024 at 5:58 PM     Finalized by (CST): Hema Chan MD on 5/23/2024 at 5:59 PM        [facility] SHARYN 2D+3D SCREENING BILAT (CPT=77067/47649)     Result Date: 5/14/2024  PROCEDURE:  [facility] SHARYN 2D+3D SCREENING BILAT (CPT=77067/78180)  COMPARISON:  [facility]  DIGITAL SCRN W/CAD, 10/30/2009, 10:56 AM.  [facility], DIGITAL SCREENING MARTHA W/ CAD, 2/04/2011, 11:48 AM.  [facility], DIGITAL SCREENING MARTHA W/ CAD, 10/17/2012, 10:30 AM.  [facility], [facility] SHARYN 2D+3D SCREENING BILAT (CPT=77067/45408), 5/03/2023, 10:25 AM.  INDICATIONS:  Z12.31 Screening mammogram for breast cancer  VIEWS OBTAINED:  Routine views of both breasts were obtained.  Standard 2D and additional multiplane thin sections of the breasts were obtained for the purpose of Tomosynthesis evaluation.  The images were reconstructed and reviewed on the dedicated Tomosynthesis workstation.  BREAST COMPOSITION:  Scattered areas fibroglandular density.  FINDINGS:  An asymmetry versus tissue is questioned in the upper posterior left breast.  There are no spiculated masses, areas of nonsurgical distortion, or suspicious grouped calcifications in the right breast.              CONCLUSION:  No suspicious change from prior mammography.  No mammographic evidence of malignancy.  BI-RADS CATEGORY:  DIAGNOSTIC CATEGORY 0--INCOMPLETE ASSESSMENT: NEED ADDITIONAL IMAGING EVALUATION.   RECOMMENDATIONS:  ADDITIONAL MAMMOGRAPHIC VIEWS REQUIRED--We will call the patient back for additional views and issue an addendum report. LEFT BREAST       A letter explaining the results in lay terms has been sent to the patient.  This exam was evaluated with a computer-aided device.  This patient's information has been entered into a reminder system with a target due date for the next mammogram.   LOCATION:  [facility]   Dictated by (CST): Stacey Rodgers MD on 5/14/2024 at 2:18 PM     Finalized by (CST): Stacey Rodgers MD on 5/14/2024 at 2:23 PM                 Impression:      Patient Active Problem List   Diagnosis    Ureterolithiasis    Hydronephrosis with urinary obstruction due to renal calculus         58 year old female with hx of chronic back pain, who presented to the ED in [place] for recurrence of left flank pain. She had been  diagnosed with left ureteral stone 5/27 at [place] [facility] and discharged home on Norco and Flomax. Unfortunately she returned because pain returned and Norco did not relieve adequately. She was afebrile and hemodynamically stable at time of presentation. Labs showed normal renal function with serum creatinine 0.88 and normal white count. Urinalysis 3-5 RBC/hpf, rare bacteria. She was admitted for further evaluation and mgmt. Urology consulted.     Reviewed with patient CT scan results. Stone likely closer to ~7.5mm as noted on Coronal images (series 601, image 52). No additional stones noted. We reviewed consideration for inpatient vs outpatient management of her stone. At this time no surgery is anticipated today. After discussion with attending, will plan for Friday 0700 cystoscopy with left retrograde pyelogram, ureteroscopy, laser lithotripsy, stone extraction, left ureteral stent placement with Dr. Malou Saldaña.      Recommendations:  OK for diet today. If her pain remains controlled she could discharge home and return as outpatient for treatment of stone on Friday morning. Should continue straining urine, pain medication prn.      Updated nursing staff with plan above.      Thank you for allowing me to participate in the care of your patient.    Addendum:  She is s/p cysto, attempted left ureteroscopy and stent placement at [facility] 5/31/24.  Returns today for definitive ureteroscopy.  Risks and side effects reviewed.

## 2024-06-26 NOTE — ANESTHESIA PREPROCEDURE EVALUATION
Anesthesia PreOp Note    HPI:     Javi Padilla is a 58 year old female who presents for preoperative consultation requested by: Malou Saldaña MD    Date of Surgery: 2024    Procedure(s):  Cystoscopy, left retrograde pyelogram, left ureteroscopy, laser lithotripsy, stone removal, stent exchange  CYSTOSCOPY URETEROSCOPY  LASER HOLMIUM LITHOTRIPSY  CYSTOSCOPY STENT INSERTION  Indication: Left ureteral stone [N20.1]    Relevant Problems   No relevant active problems       NPO:                         History Review:  Patient Active Problem List    Diagnosis Date Noted   • Hydronephrosis with urinary obstruction due to renal calculus 2024   • Ureterolithiasis 2024       Past Medical History:   • Back pain, chronic   • COVID-19   • COVID-19   • Cyst of joint of shoulder    RIGHT   • Esophageal reflux   • Hx of motion sickness   • Irregular periods/menstrual cycles    started 3 years ago   • PONV (postoperative nausea and vomiting)    nauseous during epidural   • Visual impairment    glasses       Past Surgical History:   Procedure Laterality Date   • Appendectomy      age 16   •      • Colonoscopy N/A 2023    Procedure: COLONOSCOPY;  Surgeon: Radhames Kaiser DO;  Location:  ENDOSCOPY       Medications Prior to Admission   Medication Sig Dispense Refill Last Dose   • sulfamethoxazole-trimethoprim -160 MG Oral Tab per tablet Take 1 tablet by mouth 2 (two) times daily for 7 days. 14 tablet 0 2024 at 1700   • diazePAM 2 MG Oral Tab Take 1 tablet (2 mg total) by mouth 3 (three) times daily as needed for Anxiety. 15 tablet 0 2024   • fluticasone propionate 50 MCG/ACT Nasal Suspension 2 sprays by Nasal route 3 (three) times daily as needed for Allergies.   2024   • ibuprofen 200 MG Oral Tab Take 3 tablets (600 mg total) by mouth every 6 (six) hours as needed for Pain.   2024   • oxyCODONE 5 MG Oral Tab Take 1 tablet (5 mg total) by mouth every 6 (six) hours as  needed for Pain. 15 tablet 0    • tamsulosin 0.4 MG Oral Cap Take 1 capsule (0.4 mg total) by mouth daily. Take 1/2 hour following the same meal each day 30 capsule 0    • oxybutynin 5 MG Oral Tab Take 1 tablet (5 mg total) by mouth 3 (three) times daily. 90 tablet 0    • phenazopyridine (PYRIDIUM) 100 MG Oral Tab Take 1 tablet (100 mg total) by mouth 3 (three) times daily as needed for Pain. This will turn your urine orange. 10 tablet 0      Current Facility-Administered Medications Ordered in Epic   Medication Dose Route Frequency Provider Last Rate Last Admin   • lactated ringers infusion   Intravenous Continuous Malou Saldaña MD       • acetaminophen (Tylenol Extra Strength) tab 1,000 mg  1,000 mg Oral Once Malou Saldaña MD         No current [facility]-ordered outpatient medications on file.       Allergies   Allergen Reactions   • Amoxicillin UNKNOWN       Family History   Problem Relation Age of Onset   • Other (colon cancer) Father    • Other (prostate cancer) Father    • Colon Cancer Father    • Heart Disease Mother    • Diabetes Brother    • Other (prostate cancer) Brother      Social History     Socioeconomic History   • Marital status:    Tobacco Use   • Smoking status: Former     Types: Cigarettes   • Smokeless tobacco: Never   • Tobacco comments:     Quit 1991   Vaping Use   • Vaping status: Never Used   Substance and Sexual Activity   • Alcohol use: No   • Drug use: No   • Sexual activity: Yes     Partners: Male       Available pre-op labs reviewed.  Lab Results   Component Value Date    WBC 4.9 06/14/2024    RBC 4.62 06/14/2024    HGB 12.5 06/14/2024    HCT 38.6 06/14/2024    MCV 83.5 06/14/2024    MCH 27.1 06/14/2024    MCHC 32.4 06/14/2024    RDW 15.1 06/14/2024    .0 06/14/2024     Lab Results   Component Value Date     06/14/2024    K 3.3 (L) 06/14/2024     06/14/2024    CO2 29.0 06/14/2024    BUN 14 06/14/2024    CREATSERUM 0.67 06/14/2024    GLU 85 06/14/2024    CA 8.8  06/14/2024          Vital Signs:  Body mass index is 24.84 kg/m².   height is 1.499 m (4' 11\") and weight is 55.8 kg (123 lb). Her oral temperature is 97.6 °F (36.4 °C). Her blood pressure is 127/74 and her pulse is 57. Her respiration is 15 and oxygen saturation is 100%.   Vitals:    06/19/24 1005 06/26/24 0822   BP:  127/74   Pulse:  57   Resp:  15   Temp:  97.6 °F (36.4 °C)   TempSrc:  Oral   SpO2:  100%   Weight: 56.7 kg (125 lb) 55.8 kg (123 lb)   Height: 1.499 m (4' 11\")         Anesthesia Evaluation     Patient summary reviewed and Nursing notes reviewed    History of anesthetic complications   Airway   Mallampati: I  TM distance: >3 FB  Neck ROM: full  Dental    (+) implants    Pulmonary - negative ROS and normal exam   Cardiovascular - normal exam  Exercise tolerance: good    Neuro/Psych - negative ROS     GI/Hepatic/Renal    (+) GERD well controlled    Endo/Other    Abdominal      Other findings: All upper front implants          Anesthesia Plan:   ASA:  2  Plan:   General  Airway:  LMA  Plan Comments: Discussed anesthetic risks such as but, not limited to, CVA, MI, dental damage, awareness and aspiration; verbalizes understanding and wishes to proceed.  The anesthetic dental exam does not represent a complete dental/oral exam performed by a dental professional. Notations on the dental diagram can help to highlight areas of concern and may no reflect all findings.  Informed Consent Plan and Risks Discussed With:  Patient  Discussed plan with:  Attending    I have informed Javi Padilla and/or legal guardian or family member of the nature of the anesthetic plan, benefits, risks including possible dental damage if relevant, major complications, and any alternative forms of anesthetic management.   All of the patient's questions were answered to the best of my ability. The patient desires the anesthetic management as planned.  Christine Deng CRNA  6/26/2024 8:23 AM  Present on Admission:  **None**

## 2024-06-26 NOTE — OPERATIVE REPORT
[facility]  part of Located within [facility]    Operative Note     Javi Padilla Location: OR   CSN [id number] MRN [id number]   Admission Date 6/26/2024 Operation Date 6/26/2024   Attending Physician Malou Saldaña MD Operating Physician Malou Saldaña MD      Preoperative Diagnosis: Left ureteral stone [N20.1]     Postoperative Diagnosis: Left ureteral stone [N20.1]     Procedure Performed:   Cystoscopy, left retrograde pyelogram, left ureteroscopy, laser lithotripsy, stent exchange     Primary Surgeon: Malou Saldaña MD      Assistant: none     Surgical Findings: Grossly unremarkable urethra and bladder.  There is a 6 mm stone in the proximal left ureter which was lithotripsied and dusted into small sediment.     Anesthesia: General     Complications: None     Implants:   Implant Name Type Inv. Item Serial No.  Lot No. LRB No. Used Action   STENT URET 6FR L22CM DIA2MM ASCERTA - SN/A  STENT URET 6FR L22CM DIA2MM ASCERTA N/A [employer] [place] WD [id number] Left 1 Implanted        Specimen: None     Drains: None     Condition: Stable     Estimated Blood Loss: No data recorded     Summary of Case: After consent was obtained and preoperative antibiotics administered the patient was brought into the operating room.  Anesthesia was administered and she was placed in the dorsolithotomy position.  The groin was prepped and draped in the usual sterile standard fashion.  22 Vietnamese rigid cystoscope was placed per urethra.  The previously placed stent was grasped distally and pulled back to the urethral meatus.  It was cannulated using a sensor wire.  The stent was removed leaving the wire in place.  An 8/10 coaxial dilator was then used to dilate the ureter and introduce a second wire.  Over one of the 2 wires I placed a [employer] digital flexible ureteroscope.  This went in without any difficulty.  I placed it all the way into the collecting system.  The collecting system was carefully inspected.  No  stones were seen within it.  In the proximal left ureter there was a 6 mm stone.  A 200 µm holmium laser fiber was used to fragment and dust the stone into small fragments.  No additional stones could be seen along the length of the ureter.  The ureteroscope was removed leaving the wire in place.  The wire was backloaded onto the rigid cystoscope and I placed a 6 [place] by 22 cm stent and left and on Dangler's which were secured to the patient's mons pubis using adhesives and a Tegaderm.  The patient will be instructed to remove the stent on Friday (2 days).  She will follow-up with our physician assistant in 1 month with a kidney ultrasound 1 to 2 days prior to the appointment.     Malou Saldaña MD  6/26/2024  9:26 AM

## 2024-06-27 ENCOUNTER — TELEPHONE (OUTPATIENT)
Dept: SURGERY | Facility: CLINIC | Age: 59
End: 2024-06-27

## 2024-06-27 DIAGNOSIS — N13.30 HYDRONEPHROSIS, UNSPECIFIED HYDRONEPHROSIS TYPE: Primary | ICD-10-CM

## 2024-06-27 NOTE — TELEPHONE ENCOUNTER
Patient scheduled 1 month follow up from 6/26 procedure for 7/29. Patient will be traveling from 7/26-7/28. Patient is asking if it is okay to do her kidney ultrasound on 7/25. See 6/26 telephone encounter. Please call.

## 2024-06-28 NOTE — TELEPHONE ENCOUNTER
Received a fax from [facility] requesting an ultrasound for kidneys. Please advice, order was not placed.

## 2024-07-08 ENCOUNTER — HOSPITAL ENCOUNTER (OUTPATIENT)
Dept: MAMMOGRAPHY | Age: 59
Discharge: HOME | End: 2024-07-08
Attending: FAMILY MEDICINE
Payer: COMMERCIAL

## 2024-07-08 DIAGNOSIS — R92.2 INCONCLUSIVE MAMMOGRAM: ICD-10-CM

## 2024-07-08 PROCEDURE — 77061 BREAST TOMOSYNTHESIS UNI: CPT | Performed by: FAMILY MEDICINE

## 2024-07-08 PROCEDURE — 77065 DX MAMMO INCL CAD UNI: CPT | Performed by: FAMILY MEDICINE

## 2024-07-15 ENCOUNTER — OFFICE VISIT (OUTPATIENT)
Dept: SURGERY | Facility: CLINIC | Age: 59
End: 2024-07-15
Payer: COMMERCIAL

## 2024-07-15 DIAGNOSIS — N20.1 LEFT URETERAL STONE: Primary | ICD-10-CM

## 2024-07-15 LAB
APPEARANCE: CLEAR
BILIRUBIN: NEGATIVE
GLUCOSE (URINE DIPSTICK): NEGATIVE MG/DL
KETONES (URINE DIPSTICK): NEGATIVE MG/DL
LEUKOCYTES: NEGATIVE
MULTISTIX LOT#: NORMAL NUMERIC
NITRITE, URINE: NEGATIVE
OCCULT BLOOD: NEGATIVE
PH, URINE: 7 (ref 4.5–8)
PROTEIN (URINE DIPSTICK): NEGATIVE MG/DL
SPECIFIC GRAVITY: 1.02 (ref 1–1.03)
URINE-COLOR: YELLOW
UROBILINOGEN,SEMI-QN: 1 MG/DL (ref 0–1.9)

## 2024-07-15 PROCEDURE — 81003 URINALYSIS AUTO W/O SCOPE: CPT

## 2024-07-15 PROCEDURE — 99243 OFF/OP CNSLTJ NEW/EST LOW 30: CPT

## 2024-07-15 NOTE — PROGRESS NOTES
[facility], [facility]    Urology Consult Note    History of Present Illness:   Patient is a(n) 58 year old female with hx of GERD, and s/p left URS with Dr. Saldaña on 24 who presents for follow up.    She feels well today. Before stone, had a low pelvic pressure for a long time and this has resolved. Stent was removed POD2 without complications. Has not gotten KBUS due to scheduling issues.     This is her first stone. Has been trying to increase water intake since. No urinary complaints. UA today neg.     HISTORY:  Past Medical History:    Back pain, chronic    COVID-19    COVID-19    Cyst of joint of shoulder    RIGHT    Esophageal reflux    Hx of motion sickness    Irregular periods/menstrual cycles    started 3 years ago    PONV (postoperative nausea and vomiting)    nauseous during epidural    Visual impairment    glasses      Past Surgical History:   Procedure Laterality Date    Appendectomy      age 16          Colonoscopy N/A 2023    Procedure: COLONOSCOPY;  Surgeon: Radhames Kaiser DO;  Location:  ENDOSCOPY      Family History   Problem Relation Age of Onset    Other (colon cancer) Father     Other (prostate cancer) Father     Colon Cancer Father     Heart Disease Mother     Diabetes Brother     Other (prostate cancer) Brother       Social History:   Social History     Socioeconomic History    Marital status:    Tobacco Use    Smoking status: Former     Types: Cigarettes    Smokeless tobacco: Never    Tobacco comments:     Quit    Vaping Use    Vaping status: Never Used   Substance and Sexual Activity    Alcohol use: No    Drug use: No    Sexual activity: Yes     Partners: Male     Social Determinants of Health     Food Insecurity: No Food Insecurity (2024)    Food Insecurity     Food Insecurity: Never true   Transportation Needs: No Transportation Needs (2024)    Transportation Needs     Lack of Transportation: No   Housing  Stability: Low Risk  (5/29/2024)    Housing Stability     Housing Instability: No        Allergies  Allergies   Allergen Reactions    Amoxicillin UNKNOWN       Review of Systems:   A 10-point review of systems was completed and is negative other than as noted above.    Physical Exam:   LMP 03/05/2011     GENERAL APPEARANCE: no acute distress  NEUROLOGIC: converses appropriately  HEAD: atraumatic, normocephalic  LUNGS: non-labored breathing  ABDOMEN: soft, nontender, non-distended  BACK: no CVA tenderness  PSYCH: appropriate affect and mood    Results:     Laboratory Data:  Lab Results   Component Value Date    WBC 4.9 06/14/2024    HGB 12.5 06/14/2024    .0 06/14/2024     Lab Results   Component Value Date     06/14/2024    K 3.3 (L) 06/14/2024     06/14/2024    CO2 29.0 06/14/2024    BUN 14 06/14/2024    GLU 85 06/14/2024    AST 23 05/27/2024    ALT 22 05/27/2024    TP 6.8 05/27/2024    ALB 3.7 05/27/2024    CA 8.8 06/14/2024       Urinalysis Results (last 3 years):  Recent Labs     05/27/24  1536 05/28/24  2331 07/15/24  0929   COLORUR Yellow Yellow  --    CLARITY Clear Clear  --    SPECGRAVITY <=1.005 1.015 1.020   PHURINE 5.0 7.0 7.0   PROUR Negative Negative  --    GLUUR Negative Negative  --    KETUR Negative Negative  --    BILUR Negative Negative  --    BLOODURINE Trace-Intact* Small*  --    NITRITE Negative Negative Negative   UROBILINOGEN 0.2 0.2  --    LEUUR Negative Trace*  --    WBCUR None Seen 1-5  1-5  --    RBCUR None Seen 3-5*  3-5*  --    BACUR 1+* Rare*  Rare*  --        Urine Culture Results (last 3 years):  Lab Results   Component Value Date    URINECUL  06/17/2024     10-50,000 cfu/ml Multiple species present-probable contamination.    URINECUL  05/28/2024     <10,000 cfu/ml Mixture of Gram positive organisms isolated - probable contamination.       Imaging  [facility] SHARYN 2D+3D DIAGNOSTIC ADDL VWS LEFT (CPT=77065/95230)    Result Date: 7/8/2024  PROCEDURE:  [facility] SHARYN 2D+3D  DIAGNOSTIC ADDL VWS LEFT (CPT=77065/82763)  COMPARISON:  [place], , [facility] SHARYN 2D+3D SCREENING BILAT (CPT=77067/81413), 5/14/2024, 2:00 PM.  INDICATIONS:  R92.2 Inconclusive mammogram  VIEWS OBTAINED:  Diagnostic views of the left breast were obtained.  Standard 2D and additional multiplane thin sections of the breast were obtained for the purpose of Tomosynthesis evaluation.  The images were reconstructed and reviewed on the dedicated Tomosynthesis workstation.  BREAST COMPOSITION:   Scattered areas fibroglandular density.  FINDINGS:  Spot-compression views demonstrate dissipation of previously questioned asymmetry.            CONCLUSION:   BI-RADS CATEGORY:  DIAGNOSTIC CATEGORY 1--NEGATIVE ASSESSMENT.   RECOMMENDATIONS:  ROUTINE MAMMOGRAM AND CLINICAL EVALUATION IN 12 MONTHS.      A letter explaining the results in lay terms has been sent to the patient.  This exam was evaluated with a computer-aided device.  This patient's information has been entered into a reminder system with a target due date for the next mammogram.   LOCATION:  [place]       Dictated by (CST): Jr Stone MD on 7/08/2024 at 3:23 PM     Finalized by (CST): Jr Stone MD on 7/08/2024 at 3:24 PM       XR OR - N/C    Result Date: 6/27/2024  PROCEDURE: XR OR - N/C  COMPARISON: None.  INDICATIONS: Cystoscopy, left retrograde pyelogram, left ureteroscopy, laser lithotripsy, stone removal, stent exchange  FINDINGS:   FLUORO TIME: 24.8 second TOTAL IMAGES: 32 AIR KERMA: 1.394 mGy DAP: 0.6 143 Gcym^2  FINDINGS/CONCLUSION:         Fluoroscopy support was provided. A radiologist was not present for the procedure. Images demonstrate limited fluoroscopic imaging from ureteroscopy. Please see separate procedural/operative report for details.   Dictated by (CST): Tania Parmar MD on 6/27/2024 at 8:14 AM     Finalized by (CST): Tania Parmar MD on 6/27/2024 at 8:15 AM              Impression:   Recommendations:  Kidney stone  -  discussed stone prevention strategies and reviewed educational materials for this  - KBUS priority changed to assess hydro     Thank you very much for this consult. Please call if there are any questions or concerns.     Di Vizcaino PA-C  Urology  [facility]  Phone: [phone]    Date: 7/15/2024  Time: 9:45 AM

## 2024-07-15 NOTE — PATIENT INSTRUCTIONS
General Recommendations to Avoid Future Kidney Stones    1. Drink enough water to produce 2 liters of clear urine daily. You may need to use a container at first to measure how much you are actually producing and increase your intake accordingly. Try to start the day off with a large glass of water as we all wake up dehydrated in the morning.    2. Add lemon or lime juice to your water. These juices contain citrate which naturally inhibits stone formation. An easy way to do this is using sugar free lemonade mix (ie Crystal Light or True Lemon (if you prefer to avoid aspartame))    3. Avoid salty foods such as prepackaged and fast foods, and do not add salt to foods. Try to limit sodium intake to 2500mg maximum.     4. Decrease phosphorus (soda) and caffeine.     5. Limit intake of the following group of foods to one serving daily: spinach, nuts (especially almonds), tea (especially black tea), chocolate, and potatoes.    6. Limit intake of Vitamin C supplements to less than 1000 mg daily.    7. Limit intake of animal protein (beef, chicken, turkey, fish, pork) to one serving daily. A good rule for portion size is no more meat than will fit in the palm of your hand.    8. Limit roasted nuts to 2-3 servings per week.    9.  Maintain 1-2 servings of dairy products daily (1 serving = 1 glass of milk, 2 slices of cheese, 1 scoop of ice cream, or 1 cup of yogurt). Try to decrease cheese intake as it may increase kidney stone risk compared to other dairy products. Do not eliminate calcium from your diet as it is necessary for bone health.   -Females should try not to exceed 500mg per day  -Males should try not to exceed 750mg per day    10. Take a daily B complex vitamin or 100mg vitamin B6 daily. This may help to decrease oxalate in the urine.     11. Take a daily magnesium supplement of 300mg daily. Magnesium binds to oxalate in the same way that calcium does but is more soluble.  Magnesium will take the place of calcium  when binding with oxalate and be less prone to form stones.    12. Eat a low fat diet and exercise at least 30 minutes 3 times per week to try and maintain your ideal body weight. Being overweight is a risk factor for kidney stones too!

## 2024-07-16 ENCOUNTER — HOSPITAL ENCOUNTER (OUTPATIENT)
Dept: ULTRASOUND IMAGING | Facility: HOSPITAL | Age: 59
Discharge: HOME | End: 2024-07-16
Payer: COMMERCIAL

## 2024-07-16 DIAGNOSIS — N20.1 LEFT URETERAL STONE: ICD-10-CM

## 2024-07-16 PROCEDURE — 76770 US EXAM ABDO BACK WALL COMP: CPT

## 2024-07-19 ENCOUNTER — TELEPHONE (OUTPATIENT)
Dept: SURGERY | Facility: CLINIC | Age: 59
End: 2024-07-19

## 2024-07-19 NOTE — TELEPHONE ENCOUNTER
Patient calling to see if SHAHNAZ Harper could prescribed medication to help patient pass the rest of her kidney stone or to at least help with the discomfort the patient is feeling. Please advise.

## 2024-07-22 RX ORDER — TAMSULOSIN HYDROCHLORIDE 0.4 MG/1
0.4 CAPSULE ORAL DAILY
Qty: 30 CAPSULE | Refills: 0 | Status: SHIPPED | OUTPATIENT
Start: 2024-07-22 | End: 2024-08-21

## 2024-07-30 ENCOUNTER — HOSPITAL ENCOUNTER (OUTPATIENT)
Dept: CT IMAGING | Age: 59
Discharge: HOME | End: 2024-07-30
Payer: COMMERCIAL

## 2024-07-30 DIAGNOSIS — N13.30 HYDRONEPHROSIS, UNSPECIFIED HYDRONEPHROSIS TYPE: ICD-10-CM

## 2024-07-30 PROCEDURE — 76377 3D RENDER W/INTRP POSTPROCES: CPT

## 2024-07-30 PROCEDURE — 74178 CT ABD&PLV WO CNTR FLWD CNTR: CPT

## 2024-08-08 RX ORDER — OXYBUTYNIN CHLORIDE 5 MG/1
5 TABLET ORAL 3 TIMES DAILY
Qty: 90 TABLET | Refills: 0 | OUTPATIENT
Start: 2024-08-08

## 2024-08-26 RX ORDER — TAMSULOSIN HYDROCHLORIDE 0.4 MG/1
0.4 CAPSULE ORAL
Qty: 90 CAPSULE | Refills: 0 | OUTPATIENT
Start: 2024-08-26

## 2025-05-09 ENCOUNTER — OFFICE VISIT (OUTPATIENT)
Dept: FAMILY MEDICINE CLINIC | Facility: CLINIC | Age: 60
End: 2025-05-09
Payer: COMMERCIAL

## 2025-05-09 VITALS
HEART RATE: 69 BPM | BODY MASS INDEX: 24.74 KG/M2 | WEIGHT: 126 LBS | RESPIRATION RATE: 16 BRPM | DIASTOLIC BLOOD PRESSURE: 74 MMHG | SYSTOLIC BLOOD PRESSURE: 124 MMHG | OXYGEN SATURATION: 99 % | HEIGHT: 60 IN

## 2025-05-09 DIAGNOSIS — R39.9 UTI SYMPTOMS: Primary | ICD-10-CM

## 2025-05-09 DIAGNOSIS — Z87.442 HISTORY OF KIDNEY STONES: ICD-10-CM

## 2025-05-09 DIAGNOSIS — Z12.31 SCREENING MAMMOGRAM FOR BREAST CANCER: ICD-10-CM

## 2025-05-09 LAB
APPEARANCE: CLEAR
BILIRUBIN: NEGATIVE
GLUCOSE (URINE DIPSTICK): NEGATIVE MG/DL
KETONES (URINE DIPSTICK): NEGATIVE MG/DL
MULTISTIX LOT#: ABNORMAL NUMERIC
NITRITE, URINE: NEGATIVE
OCCULT BLOOD: NEGATIVE
PH, URINE: 7 (ref 4.5–8)
PROTEIN (URINE DIPSTICK): NEGATIVE MG/DL
SPECIFIC GRAVITY: 1.02 (ref 1–1.03)
URINE-COLOR: YELLOW
UROBILINOGEN,SEMI-QN: 0.2 MG/DL (ref 0–1.9)

## 2025-05-09 PROCEDURE — 81003 URINALYSIS AUTO W/O SCOPE: CPT

## 2025-05-09 PROCEDURE — 3078F DIAST BP <80 MM HG: CPT

## 2025-05-09 PROCEDURE — 99214 OFFICE O/P EST MOD 30 MIN: CPT

## 2025-05-09 PROCEDURE — 3074F SYST BP LT 130 MM HG: CPT

## 2025-05-09 PROCEDURE — 87086 URINE CULTURE/COLONY COUNT: CPT

## 2025-05-09 PROCEDURE — 3008F BODY MASS INDEX DOCD: CPT

## 2025-05-09 RX ORDER — CIPROFLOXACIN 250 MG/1
250 TABLET, FILM COATED ORAL 2 TIMES DAILY
Qty: 10 TABLET | Refills: 0 | Status: SHIPPED | OUTPATIENT
Start: 2025-05-09 | End: 2025-05-14

## 2025-05-09 NOTE — PROGRESS NOTES
[facility] Family Medicine Office Note  Chief Complaint:   Chief Complaint   Patient presents with    Abdominal Pain     X2 weeks, bloating    Back Pain     Lower and upper back pain       HPI:   Javi Padilla is a 59 year old female coming in for concerns of possibly having kidney stone.  She states that she has a history of a kidney stone about a year ago and that her current symptoms are similar to when the last 4 years kidney stone first began.  Her symptoms include pelvic pain, abdominal bloating, and bilateral lower back pain on both sides.  All symptoms began a couple weeks ago.  Back pain is intermittent and not having any back pain currently.  Denies any dysuria, color changes or blood in urine.  Reports increased urinary frequency but thinks it may be due to drinking more water.  States her urine does seem little warmer than normal.  Denies any fevers    Per chart review, she does have a history of a left kidney stone and hydronephrosis with urinary obstruction due to L kidney stone that occurred last May 2024.  She was admitted to [facility] for this in which a cystoscopy and left ureteral stent placement was placed. After hospital admission, she followed up with urology via outpatient on 7/15/2024 in which UA, CT urogram, and ultrasound kidney bladder were ordered.  The UA was negative, ultrasound kidney/bladder showed mild dilation bilaterally in renal system and no renal calculi in bilateral renal collecting systems.  CT urogram showed no hydronephrosis      Past Medical History[1]  Past Surgical History[2]  Social History:  Short Social Hx on File[3]  Family History:  Family History[4]  Allergies:  Allergies[5]  Current Meds:  Current Medications[6]   Counseling given: Not Answered  Tobacco comments: Quit 1991       REVIEW OF SYSTEMS:   Review of Systems   Constitutional:  Negative for chills, fatigue and fever.   Respiratory:  Negative for cough and shortness of breath.     Cardiovascular:  Negative for chest pain and palpitations.   Gastrointestinal:  Positive for abdominal pain. Negative for abdominal distention, constipation, nausea and vomiting.        Bloating   Genitourinary:  Positive for flank pain (Bilaterally), frequency and pelvic pain. Negative for decreased urine volume, difficulty urinating, dysuria, hematuria, urgency, vaginal bleeding, vaginal discharge and vaginal pain.   Musculoskeletal:  Positive for back pain (Low back pain).   Neurological:  Negative for dizziness, weakness and headaches.        EXAM:   /74   Pulse 69   Resp 16   Ht 5' (1.524 m)   Wt 126 lb (57.2 kg)   LMP 03/05/2011   SpO2 99%   BMI 24.61 kg/m²  Estimated body mass index is 24.61 kg/m² as calculated from the following:    Height as of this encounter: 5' (1.524 m).    Weight as of this encounter: 126 lb (57.2 kg).   Vital signs reviewed.Appears stated age, well groomed.  Physical Exam  Constitutional:       Appearance: Normal appearance.   HENT:      Head: Normocephalic and atraumatic.   Cardiovascular:      Rate and Rhythm: Normal rate and regular rhythm.      Heart sounds: Normal heart sounds.   Pulmonary:      Effort: Pulmonary effort is normal. No respiratory distress.      Breath sounds: Normal breath sounds. No stridor. No wheezing, rhonchi or rales.   Chest:      Chest wall: No tenderness.   Abdominal:      General: Abdomen is flat. Bowel sounds are normal. There is no distension.      Palpations: Abdomen is soft. There is no mass.      Tenderness: There is no abdominal tenderness. There is no right CVA tenderness, left CVA tenderness, guarding or rebound.      Hernia: No hernia is present.   Skin:     General: Skin is warm and dry.   Neurological:      Mental Status: She is alert and oriented to person, place, and time.   Psychiatric:         Mood and Affect: Mood normal.         Behavior: Behavior normal.         Thought Content: Thought content normal.         Judgment:  Judgment normal.           ASSESSMENT AND PLAN:   1. UTI symptoms  UA shows trace leukocytes indicating possible UTI or may be related to potential kidney stone(s).  Will start patient empirically on ciprofloxacin and send urine out to be cultured.  Will update patient when culture results are received  - Urine Culture, Routine; Future  - URINALYSIS, AUTO, W/O SCOPE  - ciprofloxacin (CIPRO) 250 MG Oral Tab; Take 1 tablet (250 mg total) by mouth 2 (two) times daily for 5 days.  Dispense: 10 tablet; Refill: 0    2. History of kidney stones  Patient displayed no flank pain today during physical exam.  However given the UA did show trace leukocytes and given she has a history of kidney stones we will order CT abdomen pelvis to assess if symptoms are related to a new onset of a kidney stone.  Advised to continue drink water and go to the ER if pain worsens in severity or new urinary symptoms develop.  - CT ABDOMEN+PELVIS KIDNEYSTONE 2D RNDR(NO IV,NO ORAL)(CPT=74176); Future    3. Screening mammogram for breast cancer  Due for mammogram order placed today.  - [facility] SHARYN 2D+3D SCREENING BILAT (CPT=77067/63810); Future      Meds, Orders, & Refills for this Visit:  Requested Prescriptions     Signed Prescriptions Disp Refills    ciprofloxacin (CIPRO) 250 MG Oral Tab 10 tablet 0     Sig: Take 1 tablet (250 mg total) by mouth 2 (two) times daily for 5 days.         Imaging & Consults:  [facility] SHARYN 2D+3D SCREENING BILAT (CPT=77067/26016)  CT ABDOMEN+PELVIS KIDNEYSTONE 2D RNDR(NO IV,NO ORAL)(CPT=74176)      Health Maintenance:  Health Maintenance Due   Topic Date Due    DTaP,Tdap,and Td Vaccines (1 - Tdap) Never done    Pap Smear  Never done    Pneumococcal Vaccine: 50+ Years (1 of 1 - PCV) Never done    Zoster Vaccines (1 of 2) Never done    COVID-19 Vaccine (5 - 2024-25 season) 09/01/2024    Annual Depression Screening  01/01/2025    Annual Physical  04/30/2025    Mammogram  07/08/2025         Problem List:  Problem  List[7]      Patient/Caregiver Education: Patient/Caregiver Education: There are no barriers to learning. Medical education done.   Outcome: Javi Padilla verbalizes understanding, agrees with the plan, and had no other questions at the end of today's visit. Javi Padilla is informed to call with any questions, complications, allergies, or worsening or changing symptoms.  Javi Padilla is to call with any side effects or complications from the treatments as a result of today.     Follow-up in   Return if symptoms worsen or fail to improve.    Note to patient: The  Cures Act makes medical notes like these available to patients in the interest of transparency. However, this is a medical document intended as peer to peer communication. It is written in medical language and may contain abbreviations or verbiage that are unfamiliar. It may appear blunt or direct. Medical documents are intended to carry relevant information, facts as evident, and the clinical opinion of the practitioner.         [1]   Past Medical History:   Allergic rhinitis    Back pain, chronic    COVID-19    COVID-19    Cyst of joint of shoulder    RIGHT    Esophageal reflux    Hx of motion sickness    Irregular periods/menstrual cycles    started 3 years ago    PONV (postoperative nausea and vomiting)    nauseous during epidural    Visual impairment    glasses   [2]   Past Surgical History:  Procedure Laterality Date    Appendectomy      age 16    Appendectomy  1968          Colonoscopy N/A 2023    Procedure: COLONOSCOPY;  Surgeon: Radhames Kaiser DO;  Location:  ENDOSCOPY    [facility]      Other surgical history  1992   [3]   Social History  Socioeconomic History    Marital status:    Tobacco Use    Smoking status: Former     Types: Cigarettes    Smokeless tobacco: Never    Tobacco comments:     Quit    Vaping Use    Vaping status: Never Used   Substance and Sexual Activity    Alcohol use: No    Drug  use: No    Sexual activity: Yes     Partners: Male     Social Drivers of Health     Food Insecurity: No Food Insecurity (5/9/2025)    NCSS - Food Insecurity     Worried About Running Out of Food in the Last Year: No     Ran Out of Food in the Last Year: No   Transportation Needs: No Transportation Needs (5/9/2025)    NCSS - Transportation     Lack of Transportation: No   Housing Stability: Unknown (5/9/2025)    NCSS - Housing/Utilities     Worried About Losing Housing: No     Unable to Get Utilities: No   [4]   Family History  Problem Relation Age of Onset    Other (colon cancer) Father     Other (prostate cancer) Father     Colon Cancer Father     Heart Disease Mother     Diabetes Brother     Other (prostate cancer) Brother    [5]   Allergies  Allergen Reactions    Amoxicillin UNKNOWN   [6]   Current Outpatient Medications   Medication Sig Dispense Refill    ciprofloxacin (CIPRO) 250 MG Oral Tab Take 1 tablet (250 mg total) by mouth 2 (two) times daily for 5 days. 10 tablet 0   [7]   Patient Active Problem List  Diagnosis    Ureterolithiasis    Hydronephrosis with urinary obstruction due to renal calculus

## 2025-05-12 ENCOUNTER — HOSPITAL ENCOUNTER (OUTPATIENT)
Dept: CT IMAGING | Age: 60
Discharge: HOME | End: 2025-05-12
Payer: COMMERCIAL

## 2025-05-12 DIAGNOSIS — Z87.442 HISTORY OF KIDNEY STONES: ICD-10-CM

## 2025-05-12 PROCEDURE — 74176 CT ABD & PELVIS W/O CONTRAST: CPT

## 2025-05-14 ENCOUNTER — PATIENT OUTREACH (OUTPATIENT)
Dept: INTERNAL MEDICINE CLINIC | Facility: CLINIC | Age: 60
End: 2025-05-14

## 2025-05-14 ENCOUNTER — PATIENT MESSAGE (OUTPATIENT)
Dept: INTERNAL MEDICINE CLINIC | Facility: CLINIC | Age: 60
End: 2025-05-14

## 2025-05-14 NOTE — TELEPHONE ENCOUNTER
Interpersonal Safety Assessment  Maria Esther HARRIS spoke with Javi Padilla on 05/14/25    Assessment  Within the past 12 months, have you been humiliated or emotionally abused in other ways by your partner or ex-partner?: No    Notes: Spoke with patient, who clarified that the answers provided during the screening process were inaccurate. The patient stated that she feels safe at home. Provided the Community Health Worker (CHW) contact number [phone] for any future needs related to social determinants of health (SDOH) resources.

## 2025-05-14 NOTE — PROGRESS NOTES
Interpersonal Safety: Not At Risk (5/14/2025)    Interpersonal Safety     Feels Physically and Emotionally Safe: Yes     Physically Hurt by Someone: No     Humiliated or Emotionally Abused by Someone: No     Domestic Safety - Signs of abuse/neglect: No     Domestic Safety - Pt Reported (Most Recent Flow): No   Recent Concern: Interpersonal Safety - At Risk (5/9/2025)    Interpersonal Safety     Feels Physically and Emotionally Safe: No     Physically Hurt by Someone: No     Humiliated or Emotionally Abused by Someone: No     Domestic Safety - Signs of abuse/neglect: No     Domestic Safety - Pt Reported (Most Recent Flow): No      Interpersonal Safety Assessment  Maria Esther HARRIS spoke with Javi Padilla on 05/14/25         Notes:  Spoke with patient, who clarified that the answers provided during the screening process were inaccurate. The patient stated that she feels safe at home. Provided the Community Health Worker (CHW) contact number [phone] for any future needs related to social determinants of health (SDOH) resources.

## 2025-05-19 ENCOUNTER — TELEPHONE (OUTPATIENT)
Dept: FAMILY MEDICINE CLINIC | Facility: CLINIC | Age: 60
End: 2025-05-19

## 2025-05-19 NOTE — TELEPHONE ENCOUNTER
Received call from pt. Patient read CT report and was concerned about infection, informed her urine culture was neg for infection. Patient still having same abd pain as when she saw Jonel on 5/9, looking for recs on next steps.     Routing to Jonel to update, pt still with abd pain that was discussed at 5/9 office visit. What would you recommend for pt?

## 2025-05-20 ENCOUNTER — OFFICE VISIT (OUTPATIENT)
Dept: FAMILY MEDICINE CLINIC | Facility: CLINIC | Age: 60
End: 2025-05-20
Payer: COMMERCIAL

## 2025-05-20 VITALS
OXYGEN SATURATION: 99 % | SYSTOLIC BLOOD PRESSURE: 130 MMHG | DIASTOLIC BLOOD PRESSURE: 76 MMHG | RESPIRATION RATE: 16 BRPM | WEIGHT: 130 LBS | BODY MASS INDEX: 25.52 KG/M2 | HEIGHT: 60 IN | HEART RATE: 73 BPM

## 2025-05-20 DIAGNOSIS — R10.2 PELVIC PRESSURE IN FEMALE: ICD-10-CM

## 2025-05-20 DIAGNOSIS — H69.93 EUSTACHIAN TUBE DYSFUNCTION, BILATERAL: ICD-10-CM

## 2025-05-20 DIAGNOSIS — R39.9 UTI SYMPTOMS: ICD-10-CM

## 2025-05-20 DIAGNOSIS — R42 VERTIGO: Primary | ICD-10-CM

## 2025-05-20 LAB
APPEARANCE: CLEAR
BILIRUBIN: NEGATIVE
GLUCOSE (URINE DIPSTICK): NEGATIVE MG/DL
KETONES (URINE DIPSTICK): NEGATIVE MG/DL
LEUKOCYTES: NEGATIVE
MULTISTIX LOT#: ABNORMAL NUMERIC
NITRITE, URINE: POSITIVE
OCCULT BLOOD: NEGATIVE
PH, URINE: 7 (ref 4.5–8)
PROTEIN (URINE DIPSTICK): NEGATIVE MG/DL
SPECIFIC GRAVITY: 1.01 (ref 1–1.03)
URINE-COLOR: YELLOW
UROBILINOGEN,SEMI-QN: 0.2 MG/DL (ref 0–1.9)

## 2025-05-20 PROCEDURE — 81003 URINALYSIS AUTO W/O SCOPE: CPT

## 2025-05-20 PROCEDURE — 3078F DIAST BP <80 MM HG: CPT

## 2025-05-20 PROCEDURE — 99213 OFFICE O/P EST LOW 20 MIN: CPT

## 2025-05-20 PROCEDURE — 3008F BODY MASS INDEX DOCD: CPT

## 2025-05-20 PROCEDURE — 87086 URINE CULTURE/COLONY COUNT: CPT

## 2025-05-20 PROCEDURE — 3075F SYST BP GE 130 - 139MM HG: CPT

## 2025-05-20 RX ORDER — METHYLPREDNISOLONE 4 MG/1
TABLET ORAL
Qty: 1 EACH | Refills: 0 | Status: SHIPPED | OUTPATIENT
Start: 2025-05-20

## 2025-05-20 RX ORDER — NITROFURANTOIN 25; 75 MG/1; MG/1
100 CAPSULE ORAL 2 TIMES DAILY
Qty: 14 CAPSULE | Refills: 0 | Status: SHIPPED | OUTPATIENT
Start: 2025-05-20 | End: 2025-05-27

## 2025-05-20 NOTE — PROGRESS NOTES
[facility] Family Medicine Office Note  Chief Complaint:   Chief Complaint   Patient presents with    Vertigo    Ear Pain     Ears feel full, ears pop    Ringing In Ear       HPI:   Javi Padilla is a 59 year old female coming in for dizziness, fullness and ringing in both ears as well as some pain in her left ear.  Dizziness began about 1.5 weeks ago.  States her dizziness is episodic, and comes and goes and happens at random times.  She states she does have history of vertigo but she cannot get into see her ENT until June.  States her ENT typically gives her steroid in the past for vertigo symptoms in which helped relieve the symptoms.  She has been trying to drink fluids to stay hydrated.    She recently thought the vertigo symptoms are related to a virus given her  recently had vertigo symptoms as well.      She also states she is having ongoing pressure in her vaginal area since she was last seen by me on 5/9/2025.  She states the pain is similar to when she had kidney stones previously but not as intense.  She states she has increased frequency with urination and some burning with urination that occurred yesterday.  Denies any abdominal pain.  Was seen on 5/9/2025 for chief complaint of abdominal bloating for 2 weeks and lower and upper back pain a UA was completed that showed trace leukocytes indicating possible UTI. Patient was started empirically on ciprofloxacin and urine are sent out to be cultured.  Urine culture was negative for any bacteria.  A CT abdomen pelvis was also ordered to assess if kidney stones are causing her back pain given she does have a history of kidney stones.  CT was overall unremarkable and showed no acute concerns      Past Medical History[1]  Past Surgical History[2]  Social History:  Short Social Hx on File[3]  Family History:  Family History[4]  Allergies:  Allergies[5]  Current Meds:  Current Medications[6]   Counseling given: Not Answered  Tobacco comments: Quit  1991       REVIEW OF SYSTEMS:   Review of Systems   HENT:  Positive for ear pain (left). Negative for congestion, ear discharge, hearing loss, sinus pressure and sinus pain.         Bilateral ear pressure     Respiratory:  Negative for cough and shortness of breath.    Cardiovascular:  Negative for chest pain and palpitations.   Genitourinary:  Positive for difficulty urinating, flank pain and urgency. Negative for vaginal bleeding and vaginal discharge.        Pelvic pressure   Neurological:  Positive for dizziness. Negative for weakness, light-headedness and headaches.        EXAM:   /76   Pulse 73   Resp 16   Ht 5' (1.524 m)   Wt 130 lb (59 kg)   LMP 03/05/2011   SpO2 99%   BMI 25.39 kg/m²  Estimated body mass index is 25.39 kg/m² as calculated from the following:    Height as of this encounter: 5' (1.524 m).    Weight as of this encounter: 130 lb (59 kg).   Vital signs reviewed.Appears stated age, well groomed.  Physical Exam  HENT:      Head: Normocephalic and atraumatic.      Right Ear: A middle ear effusion is present. Tympanic membrane is not injected, erythematous or bulging.      Left Ear: A middle ear effusion is present. Tympanic membrane is not injected, erythematous or bulging.      Nose: Nose normal. No congestion or rhinorrhea.      Mouth/Throat:      Mouth: Mucous membranes are moist.      Pharynx: Oropharynx is clear. No oropharyngeal exudate or posterior oropharyngeal erythema.   Eyes:      Extraocular Movements: Extraocular movements intact.      Conjunctiva/sclera: Conjunctivae normal.      Pupils: Pupils are equal, round, and reactive to light.   Cardiovascular:      Rate and Rhythm: Normal rate and regular rhythm.      Heart sounds: Normal heart sounds.   Pulmonary:      Effort: Pulmonary effort is normal. No respiratory distress.      Breath sounds: Normal breath sounds. No stridor. No wheezing, rhonchi or rales.   Chest:      Chest wall: No tenderness.   Musculoskeletal:       Cervical back: Normal range of motion.   Skin:     General: Skin is warm.   Neurological:      Mental Status: She is alert and oriented to person, place, and time.   Psychiatric:         Mood and Affect: Mood normal.         Behavior: Behavior normal.         Thought Content: Thought content normal.         Judgment: Judgment normal.        Recent Results (from the past 72 hours)   Urine Dip, auto without Micro    Collection Time: 05/20/25 10:00 AM   Result Value Ref Range    Glucose Urine Negative Negative mg/dL    Bilirubin Urine Negative Negative    Ketones, UA Negative Negative - Trace mg/dL    Spec Gravity 1.010 1.005 - 1.030    Blood Urine Negative Negative    PH Urine 7.0 5.0 - 8.0    Protein Urine Negative Negative - Trace mg/dL    Urobilinogen Urine 0.2 0.2 - 1.0 mg/dL    Nitrite Urine Positive (A) Negative    Leukocyte Esterase Urine Negative Negative    APPEARANCE clear Clear    Color Urine yellow Yellow    Multistix Lot# [id number] Numeric    Multistix Expiration Date 12/31/25 Date          ASSESSMENT AND PLAN:   1. Vertigo  Mild middle ear effusion in bilateral ears.  No hearing loss.  No sign of tympanic membrane bulging or erythematous.  Posterior Medrol Dosepak.  Advised follow-up if no improvement.  - methylPREDNISolone (MEDROL) 4 MG Oral Tablet Therapy Pack; As directed.  Dispense: 1 each; Refill: 0    2. Eustachian tube dysfunction, bilateral  Same as plan #1.  - methylPREDNISolone (MEDROL) 4 MG Oral Tablet Therapy Pack; As directed.  Dispense: 1 each; Refill: 0    3. UTI symptoms  UA was positive for nitrates indicating possible UTI ongoing.  Will treat empirically with Macrobid.  Urine sent out to be cultured.  Have low suspicion for kidney stones given no CVA tenderness and previous CT completed 5/12/2025 showed no evidence of kidney stones ongoing.  - Urine Dip, auto without Micro  - Urine Culture, Routine; Future  - nitrofurantoin monohydrate macro 100 MG Oral Cap; Take 1 capsule (100 mg total)  by mouth 2 (two) times daily for 7 days.  Dispense: 14 capsule; Refill: 0  - Urine Culture, Routine    4. Pelvic pressure in female  Suspect most likely from UTI.  If pelvic pressure continues despite finishing Macrobid antibiotic, recommend patient complete pelvic exam with PCP or completing additional imaging such as transvaginal ultrasound or ultrasound of the pelvis.  Differential diagnoses besides UTI include but are not limited to: Rectocele, cystocele, cervical polyp, bladder stones.  - Urine Dip, auto without Micro  - Urine Culture, Routine; Future  - nitrofurantoin monohydrate macro 100 MG Oral Cap; Take 1 capsule (100 mg total) by mouth 2 (two) times daily for 7 days.  Dispense: 14 capsule; Refill: 0  - Urine Culture, Routine      Meds, Orders, & Refills for this Visit:  Requested Prescriptions     Signed Prescriptions Disp Refills    methylPREDNISolone (MEDROL) 4 MG Oral Tablet Therapy Pack 1 each 0     Sig: As directed.    nitrofurantoin monohydrate macro 100 MG Oral Cap 14 capsule 0     Sig: Take 1 capsule (100 mg total) by mouth 2 (two) times daily for 7 days.         Imaging & Consults:  None      Health Maintenance:  Health Maintenance Due   Topic Date Due    DTaP,Tdap,and Td Vaccines (1 - Tdap) Never done    Pap Smear  Never done    Pneumococcal Vaccine: 50+ Years (1 of 1 - PCV) Never done    Zoster Vaccines (1 of 2) Never done    COVID-19 Vaccine (5 - 2024-25 season) 09/01/2024    Annual Depression Screening  01/01/2025    Annual Physical  04/30/2025    Mammogram  07/08/2025         Problem List:  Problem List[7]      Patient/Caregiver Education: Patient/Caregiver Education: There are no barriers to learning. Medical education done.   Outcome: Javi Padilla verbalizes understanding, agrees with the plan, and had no other questions at the end of today's visit. Javi Padilla is informed to call with any questions, complications, allergies, or worsening or changing symptoms.  Javi Padilla is  to call with any side effects or complications from the treatments as a result of today.     Follow-up in   Return if symptoms worsen or fail to improve.    Note to patient: The  [employer] Cures Act makes medical notes like these available to patients in the interest of transparency. However, this is a medical document intended as peer to peer communication. It is written in medical language and may contain abbreviations or verbiage that are unfamiliar. It may appear blunt or direct. Medical documents are intended to carry relevant information, facts as evident, and the clinical opinion of the practitioner.         [1]   Past Medical History:   Allergic rhinitis    Back pain, chronic    Calculus of kidney    COVID-19    COVID-19    Cyst of joint of shoulder    RIGHT    Esophageal reflux    Hx of motion sickness    Irregular periods/menstrual cycles    started 3 years ago    PONV (postoperative nausea and vomiting)    nauseous during epidural    Visual impairment    glasses   [2]   Past Surgical History:  Procedure Laterality Date    Appendectomy      age 16    Appendectomy  1968          Colonoscopy N/A 2023    Procedure: COLONOSCOPY;  Surgeon: Radhames Kaiser DO;  Location:  ENDOSCOPY          Other surgical history  1992   [3]   Social History  Socioeconomic History    Marital status:    Tobacco Use    Smoking status: Former     Current packs/day: 0.00     Types: Cigarettes     Quit date: 1991     Years since quittin.4    Smokeless tobacco: Never    Tobacco comments:     Quit    Vaping Use    Vaping status: Never Used   Substance and Sexual Activity    Alcohol use: Never    Drug use: Never    Sexual activity: Yes     Partners: Male   Other Topics Concern    Caffeine Concern No    Exercise No    Seat Belt No    Special Diet No    Stress Concern No    Weight Concern No     Social Drivers of Health     Food Insecurity: No Food Insecurity (2025)    [facility] - Food  Insecurity     Worried About Running Out of Food in the Last Year: No     Ran Out of Food in the Last Year: No   Transportation Needs: No Transportation Needs (5/14/2025)    NCSS - Transportation     Lack of Transportation: No   Stress: No Stress Concern Present (5/14/2025)    Stress     Feeling of Stress : No   Housing Stability: Not At Risk (5/14/2025)    NCSS - Housing/Utilities     Has Housing: Yes     Worried About Losing Housing: No     Unable to Get Utilities: No   [4]   Family History  Problem Relation Age of Onset    Other (colon cancer) Father     Other (prostate cancer) Father     Colon Cancer Father     Cancer Father     Heart Disease Mother     Cancer Mother     Diabetes Brother     Other (prostate cancer) Brother    [5]   Allergies  Allergen Reactions    Amoxicillin UNKNOWN   [6]   Current Outpatient Medications   Medication Sig Dispense Refill    methylPREDNISolone (MEDROL) 4 MG Oral Tablet Therapy Pack As directed. 1 each 0    nitrofurantoin monohydrate macro 100 MG Oral Cap Take 1 capsule (100 mg total) by mouth 2 (two) times daily for 7 days. 14 capsule 0    NON FORMULARY Take 1 tablet by mouth in the morning and 1 tablet before bedtime.     [7]   Patient Active Problem List  Diagnosis    Ureterolithiasis    Hydronephrosis with urinary obstruction due to renal calculus

## 2025-05-20 NOTE — TELEPHONE ENCOUNTER
Yes, urine culture was negative and CT was overall non-concerning. Showed no kidney stones  Recommend discussing symptoms with Dr. Gonzalez (her PCP) in more detail during her scheduled upcoming appointment with him on 5/23/2025. Perhaps additional imaging may need to be completed based on her symptoms.

## 2025-05-23 ENCOUNTER — OFFICE VISIT (OUTPATIENT)
Dept: FAMILY MEDICINE CLINIC | Facility: CLINIC | Age: 60
End: 2025-05-23
Payer: COMMERCIAL

## 2025-05-23 DIAGNOSIS — R10.2 PELVIC PRESSURE IN FEMALE: ICD-10-CM

## 2025-05-23 DIAGNOSIS — Z00.00 ENCOUNTER FOR ANNUAL PHYSICAL EXAM: Primary | ICD-10-CM

## 2025-05-23 DIAGNOSIS — M62.830 LUMBAR PARASPINAL MUSCLE SPASM: ICD-10-CM

## 2025-05-23 DIAGNOSIS — R39.9 UTI SYMPTOMS: ICD-10-CM

## 2025-05-23 DIAGNOSIS — Z00.00 LABORATORY EXAMINATION ORDERED AS PART OF A ROUTINE GENERAL MEDICAL EXAMINATION: ICD-10-CM

## 2025-05-23 PROCEDURE — 99214 OFFICE O/P EST MOD 30 MIN: CPT | Performed by: FAMILY MEDICINE

## 2025-05-23 PROCEDURE — 3079F DIAST BP 80-89 MM HG: CPT | Performed by: FAMILY MEDICINE

## 2025-05-23 PROCEDURE — 99396 PREV VISIT EST AGE 40-64: CPT | Performed by: FAMILY MEDICINE

## 2025-05-23 PROCEDURE — 3075F SYST BP GE 130 - 139MM HG: CPT | Performed by: FAMILY MEDICINE

## 2025-05-23 PROCEDURE — 3008F BODY MASS INDEX DOCD: CPT | Performed by: FAMILY MEDICINE

## 2025-05-23 NOTE — PROGRESS NOTES
The following individual(s) verbally consented to be recorded using ambient AI listening technology and understand that they can each withdraw their consent to this listening technology at any point by asking the clinician to turn off or pause the recording:    Patient name: Javi Padilla

## 2025-05-27 VITALS
OXYGEN SATURATION: 97 % | DIASTOLIC BLOOD PRESSURE: 89 MMHG | BODY MASS INDEX: 25.52 KG/M2 | HEIGHT: 60 IN | HEART RATE: 88 BPM | SYSTOLIC BLOOD PRESSURE: 138 MMHG | RESPIRATION RATE: 18 BRPM | WEIGHT: 130 LBS
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
## 2025-05-29 ENCOUNTER — LAB ENCOUNTER (OUTPATIENT)
Dept: LAB | Age: 60
End: 2025-05-29
Attending: FAMILY MEDICINE
Payer: COMMERCIAL

## 2025-05-29 DIAGNOSIS — Z00.00 LABORATORY EXAMINATION ORDERED AS PART OF A ROUTINE GENERAL MEDICAL EXAMINATION: ICD-10-CM

## 2025-05-29 LAB
ALBUMIN SERPL-MCNC: 4.6 G/DL (ref 3.2–4.8)
ALBUMIN/GLOB SERPL: 2.1 {RATIO} (ref 1–2)
ALP LIVER SERPL-CCNC: 70 U/L (ref 46–118)
ALT SERPL-CCNC: 14 U/L (ref 10–49)
ANION GAP SERPL CALC-SCNC: 8 MMOL/L (ref 0–18)
AST SERPL-CCNC: 22 U/L (ref ?–34)
BASOPHILS # BLD AUTO: 0.07 X10(3) UL (ref 0–0.2)
BASOPHILS NFR BLD AUTO: 1.4 %
BILIRUB SERPL-MCNC: 0.8 MG/DL (ref 0.3–1.2)
BUN BLD-MCNC: 15 MG/DL (ref 9–23)
CALCIUM BLD-MCNC: 9.1 MG/DL (ref 8.7–10.6)
CHLORIDE SERPL-SCNC: 103 MMOL/L (ref 98–112)
CHOLEST SERPL-MCNC: 206 MG/DL (ref ?–200)
CO2 SERPL-SCNC: 32 MMOL/L (ref 21–32)
CREAT BLD-MCNC: 0.95 MG/DL (ref 0.55–1.02)
EGFRCR SERPLBLD CKD-EPI 2021: 69 ML/MIN/1.73M2 (ref 60–?)
EOSINOPHIL # BLD AUTO: 0.15 X10(3) UL (ref 0–0.7)
EOSINOPHIL NFR BLD AUTO: 3 %
ERYTHROCYTE [DISTWIDTH] IN BLOOD BY AUTOMATED COUNT: 17.3 %
FASTING PATIENT LIPID ANSWER: YES
FASTING STATUS PATIENT QL REPORTED: YES
GLOBULIN PLAS-MCNC: 2.2 G/DL (ref 2–3.5)
GLUCOSE BLD-MCNC: 85 MG/DL (ref 70–99)
HCT VFR BLD AUTO: 38.1 % (ref 35–48)
HDLC SERPL-MCNC: 60 MG/DL (ref 40–59)
HGB BLD-MCNC: 11.2 G/DL (ref 12–16)
IMM GRANULOCYTES # BLD AUTO: 0.02 X10(3) UL (ref 0–1)
IMM GRANULOCYTES NFR BLD: 0.4 %
LDLC SERPL CALC-MCNC: 127 MG/DL (ref ?–100)
LYMPHOCYTES # BLD AUTO: 1.6 X10(3) UL (ref 1–4)
LYMPHOCYTES NFR BLD AUTO: 31.6 %
MCH RBC QN AUTO: 23.8 PG (ref 26–34)
MCHC RBC AUTO-ENTMCNC: 29.4 G/DL (ref 31–37)
MCV RBC AUTO: 81.1 FL (ref 80–100)
MONOCYTES # BLD AUTO: 0.61 X10(3) UL (ref 0.1–1)
MONOCYTES NFR BLD AUTO: 12 %
NEUTROPHILS # BLD AUTO: 2.62 X10 (3) UL (ref 1.5–7.7)
NEUTROPHILS # BLD AUTO: 2.62 X10(3) UL (ref 1.5–7.7)
NEUTROPHILS NFR BLD AUTO: 51.6 %
NONHDLC SERPL-MCNC: 146 MG/DL (ref ?–130)
OSMOLALITY SERPL CALC.SUM OF ELEC: 296 MOSM/KG (ref 275–295)
PLATELET # BLD AUTO: 307 10(3)UL (ref 150–450)
POTASSIUM SERPL-SCNC: 4.1 MMOL/L (ref 3.5–5.1)
PROT SERPL-MCNC: 6.8 G/DL (ref 5.7–8.2)
RBC # BLD AUTO: 4.7 X10(6)UL (ref 3.8–5.3)
SODIUM SERPL-SCNC: 143 MMOL/L (ref 136–145)
TRIGL SERPL-MCNC: 109 MG/DL (ref 30–149)
TSI SER-ACNC: 2.73 UIU/ML (ref 0.55–4.78)
VLDLC SERPL CALC-MCNC: 19 MG/DL (ref 0–30)
WBC # BLD AUTO: 5.1 X10(3) UL (ref 4–11)

## 2025-05-29 PROCEDURE — 80061 LIPID PANEL: CPT | Performed by: FAMILY MEDICINE

## 2025-05-29 PROCEDURE — 80050 GENERAL HEALTH PANEL: CPT | Performed by: FAMILY MEDICINE

## 2025-05-30 ENCOUNTER — TELEPHONE (OUTPATIENT)
Dept: FAMILY MEDICINE CLINIC | Facility: CLINIC | Age: 60
End: 2025-05-30

## 2025-05-30 DIAGNOSIS — D64.9 LOW HEMOGLOBIN: Primary | ICD-10-CM

## 2025-05-30 NOTE — TELEPHONE ENCOUNTER
Lab orders entered. Attempted to call patient and left message to call the office about results or read [employer] message sent.

## 2025-05-30 NOTE — TELEPHONE ENCOUNTER
See 5/29/25 lab results. Pt calling about results and recommendations. Discuss with pt that lab results now post automatically to [facility] and provider has not had a chance to review labs before patient sees them. Pt voiced understanding.

## 2025-06-04 ENCOUNTER — OFFICE VISIT (OUTPATIENT)
Dept: FAMILY MEDICINE CLINIC | Facility: CLINIC | Age: 60
End: 2025-06-04
Payer: COMMERCIAL

## 2025-06-04 VITALS
HEART RATE: 74 BPM | WEIGHT: 127.63 LBS | DIASTOLIC BLOOD PRESSURE: 76 MMHG | TEMPERATURE: 98 F | OXYGEN SATURATION: 99 % | BODY MASS INDEX: 25 KG/M2 | SYSTOLIC BLOOD PRESSURE: 118 MMHG | RESPIRATION RATE: 18 BRPM

## 2025-06-04 DIAGNOSIS — H65.93 MIDDLE EAR EFFUSION, BILATERAL: Primary | ICD-10-CM

## 2025-06-04 PROCEDURE — 99213 OFFICE O/P EST LOW 20 MIN: CPT | Performed by: NURSE PRACTITIONER

## 2025-06-04 PROCEDURE — 3078F DIAST BP <80 MM HG: CPT | Performed by: NURSE PRACTITIONER

## 2025-06-04 PROCEDURE — 3074F SYST BP LT 130 MM HG: CPT | Performed by: NURSE PRACTITIONER

## 2025-06-04 RX ORDER — METHYLPREDNISOLONE 4 MG/1
TABLET ORAL
Qty: 1 EACH | Refills: 0 | Status: SHIPPED | OUTPATIENT
Start: 2025-06-04

## 2025-06-04 NOTE — PROGRESS NOTES
CHIEF COMPLAINT:     Chief Complaint   Patient presents with    Ear Pain     Left>Right ear pain for 2 days       HPI:   Javi Padilla is a non-toxic, well appearing 59 year old female who presents today with complaints of bilat ear pressure.  Notes it has been present for 2 days. Denies fevers, decreased hearing, drainage from ear or trauma to ear. Notes recent upper respiratory symptoms last week. Denies recent swimming. Pt notes this happens frequently for her and resolves with oral steroids. She is awaiting appt with ENT and was advised to come here for oral steroids today.Tolerates PO well at home. No n/v/d.  Denies any other aggravating or relieving factors at home. Denies any other treatment attempts prior to arrival.         Current Medications[1]   Past Medical History[2]   Social History:  Short Social Hx on File[3]     REVIEW OF SYSTEMS:   GENERAL: Denies fever. Notes good appetite.   SKIN: no unusual skin lesions or rashes  HEENT: See HPI  LUNGS: No cough, shortness of breath, or wheezing.  CARDIOVASCULAR: No chest pain, palpitations  GI: No N/V/C/D.  NEURO: denies headaches or dizziness    EXAM:   /76   Pulse 74   Temp 98.2 °F (36.8 °C) (Oral)   Resp 18   Wt 127 lb 9.6 oz (57.9 kg)   LMP 03/05/2011   SpO2 99%   BMI 24.92 kg/m²   GENERAL: well developed, well nourished,in no apparent distress  SKIN: no rashes,no suspicious lesions  HEAD: atraumatic, normocephalic  EYES: conjunctiva clear, EOM intact  EARS: TM's: intact and without erythema, no bulging, no retraction,+effusion. No erythema or swelling noted to ear canal or external ear.  No pain with manipulation of tragus or auricle. No pain with insertion of otoscope.     NOSE: nostrils patent, clear nasal mucus, nasal mucosa reddened  THROAT: oral mucosa pink, moist. Posterior pharynx not erythematous or injected. No exudates. No trismus. Uvula midline and without swelling. Voice normal/clear. No stridor.  NECK: supple,  non-tender  LUNGS: clear to auscultation bilaterally, no wheezes or rhonchi. Breathing is non labored.  CARDIO: RRR without murmur  EXTREMITIES: no cyanosis, clubbing or edema  LYMPH: No lymphadenopathy.      ASSESSMENT AND PLAN:       ICD-10-CM    1. Middle ear effusion, bilateral  H65.93 methylPREDNISolone 4 MG Oral Tablet Therapy Pack        Discussed physical exam and hpi with pt. Pt has reassuring physical exam consistent with bilat middle ear effusion. No signs otitis externa or otitis media.  Lungs clear bilat. No respiratory distress noted. Treatment options discussed with patient and explained in detail. Will start medrol dose pack along with supportive care and follow up with ENT. The risks, benefits and potential side effects of possible medications were reviewed. Alternatives were discussed. Monitoring parameters and expected course outlined. Patient to call PCP or go to emergency department if symptoms fail to respond as outlined, or worsen in any way. The patient agreed with the plan.             Patient Instructions   Rest. Drink plenty of fluids.  Supportive care as discussed.   Medrol dosepack as prescribed.   Follow up with PMD or ENT in 3-4 days for reeval. Follow up sooner or go to the emergency department immediately if symptoms worsen, change, or if you have any concerns.             [1]   Current Outpatient Medications   Medication Sig Dispense Refill    methylPREDNISolone 4 MG Oral Tablet Therapy Pack Take as directed. Please take with food. 1 each 0    NON FORMULARY Take 1 tablet by mouth in the morning and 1 tablet before bedtime.     [2]   Past Medical History:   Allergic rhinitis    Back pain, chronic    Calculus of kidney    COVID-19    COVID-19    Cyst of joint of shoulder    RIGHT    Esophageal reflux    Hx of motion sickness    Irregular periods/menstrual cycles    started 3 years ago    PONV (postoperative nausea and vomiting)    nauseous during epidural    Visual impairment     glasses   [3]   Social History  Socioeconomic History    Marital status:    Tobacco Use    Smoking status: Former     Current packs/day: 0.00     Types: Cigarettes     Quit date: 1991     Years since quittin.4    Smokeless tobacco: Never    Tobacco comments:     Quit    Vaping Use    Vaping status: Never Used   Substance and Sexual Activity    Alcohol use: Never    Drug use: Never    Sexual activity: Yes     Partners: Male   Other Topics Concern    Caffeine Concern No    Exercise No    Seat Belt No    Special Diet No    Stress Concern No    Weight Concern No     Social Drivers of Health     Food Insecurity: No Food Insecurity (2025)    NCSS - Food Insecurity     Worried About Running Out of Food in the Last Year: No     Ran Out of Food in the Last Year: No   Transportation Needs: No Transportation Needs (2025)    NCSS - Transportation     Lack of Transportation: No   Stress: No Stress Concern Present (2025)    Stress     Feeling of Stress : No   Housing Stability: Not At Risk (2025)    NCSS - Housing/Utilities     Has Housing: Yes     Worried About Losing Housing: No     Unable to Get Utilities: No

## 2025-06-04 NOTE — PATIENT INSTRUCTIONS
Rest. Drink plenty of fluids.  Supportive care as discussed.   Medrol dosepack as prescribed.   Follow up with PMD or ENT in 3-4 days for reeval. Follow up sooner or go to the emergency department immediately if symptoms worsen, change, or if you have any concerns.

## (undated) DEVICE — SINGLE ACTION PUMPING SYSTEM

## (undated) DEVICE — UROLOGY DRAIN BAG

## (undated) DEVICE — KIT ENDO ORCAPOD 160/180/190

## (undated) DEVICE — 1200CC GUARDIAN II: Brand: GUARDIAN

## (undated) DEVICE — BIOGUARD CLEANING ADAPTER

## (undated) DEVICE — SOLUTION IRRIG 3000ML 0.9% NACL FLX CONT

## (undated) DEVICE — ADAPTER BX SEAL PRT ADJ FOR HYSTEROSCOPE

## (undated) DEVICE — 10FT COMBINED O2 DELIVERY/CO2 MONITORING. FILTER WITH MICROSTREAM TYPE LUER: Brand: DUAL ADULT NASAL CANNULA

## (undated) DEVICE — PAD,EYE,LARGE,2 1/8"X2 5/8",STERILE,LF: Brand: MEDLINE

## (undated) DEVICE — ENDOSCOPIC VALVE WITH ADAPTER.: Brand: SURSEAL® II

## (undated) DEVICE — ENDOSCOPY PACK - LOWER: Brand: MEDLINE INDUSTRIES, INC.

## (undated) DEVICE — GLOVE SUR 7.5 SENSICARE PI PIP CRM PWD F

## (undated) DEVICE — ADHESIVE LIQ 2/3ML VI MASTISOL

## (undated) DEVICE — DILATOR/SHEATH SET: Brand: 8/10 DILATOR/SHEATH SET

## (undated) DEVICE — SLEEVE COMPR MD KNEE LEN SGL USE KENDALL SCD

## (undated) DEVICE — SOLUTION IRRIG 1000ML 0.9% NACL USP BTL

## (undated) DEVICE — SERVICE RENTAL LSR TECH ONLY

## (undated) DEVICE — GAMMEX® PI HYBRID SIZE 8, STERILE POWDER-FREE SURGICAL GLOVE, POLYISOPRENE AND NEOPRENE BLEND: Brand: GAMMEX

## (undated) DEVICE — 3M™ RED DOT™ MONITORING ELECTRODE WITH FOAM TAPE AND STICKY GEL, 50/BAG, 20/CASE, 72/PLT 2570: Brand: RED DOT™

## (undated) DEVICE — SNAP KOVER: Brand: UNBRANDED

## (undated) DEVICE — SEAL BIOPSY PORT ACMI

## (undated) DEVICE — CATHETER URET 5FR L70CM FLX OPN TIP NONPORTED

## (undated) DEVICE — CYSTO PACK: Brand: MEDLINE INDUSTRIES, INC.

## (undated) DEVICE — BALLOON DILATATION CATHETER KIT: Brand: UROMAX ULTRA KIT

## (undated) DEVICE — JELLY,LUBE,STERILE,FLIP TOP,TUBE,2-OZ: Brand: MEDLINE

## (undated) DEVICE — SINGLE-USE DIGITAL FLEXIBLE URETEROSCOPE: Brand: LITHOVUE

## (undated) DEVICE — PACK PBDS CYSTOSCOPY

## (undated) DEVICE — HYDROGEL COATED URETERAL DILATOR: Brand: NOTTINGHAM ONE-STEP

## (undated) DEVICE — SOLUTION IRRIG 1000ML ST H2O AQUALITE PLAS

## (undated) DEVICE — NITINOL WIRE WITH HYDROPHILIC TIP: Brand: SENSOR

## (undated) DEVICE — TOWEL,OR,DSP,ST,BLUE,DLX,2/PK,40PK/CS: Brand: MEDLINE

## (undated) DEVICE — FIBER LSR 200UM 2J 80HZ 60W DL FOR LITHO

## (undated) DEVICE — SYRINGE MED 20ML STD CLR PLAS LL TIP N CTRL

## (undated) DEVICE — GUIDEWIRE ENDOSCP L150CM DIA0.035IN TIP 3CM

## (undated) DEVICE — TIGERTAIL 6F FLXTIP 70CM

## (undated) NOTE — LETTER
[address]  [address]  Authorization for Surgical Operation and Procedure     Date:___________                                                                                                         Time:__________  I hereby authorize Surgeon(s):  Malou Saldaña MD, my physician and his/her assistants (if applicable), which may include medical students, residents, and/or fellows, to perform the following surgical operation/ procedure and administer such anesthesia as may be determined necessary by my physician:  Operation/Procedure name (s) Procedure(s):  CYSTOSCOPY, LEFT RETROGRADE PYELOGRAM, LEFT URETEROSCOPY, LASER LITHOTRIPSY, STONE EXTRACTION, LEFT URETERAL STENT PLACEMENT on Polyplex   2.   I recognize that during the surgical operation/procedure, unforeseen conditions may necessitate additional or different procedures than those listed above.  I, therefore, further authorize and request that the above-named surgeon, assistants, or designees perform such procedures as are, in their judgment, necessary and desirable.    3.   My surgeon/physician has discussed prior to my surgery the potential benefits, risks and side effects of this procedure; the likelihood of achieving goals; and potential problems that might occur during recuperation.  They also discussed reasonable alternatives to the procedure, including risks, benefits, and side effects related to the alternatives and risks related to not receiving this procedure.  I have had all my questions answered and I acknowledge that no guarantee has been made as to the result that may be obtained.    4.   Should the need arise during my operation/procedure, which includes change of level of care prior to discharge, I also consent to the administration of blood and/or blood products.  Further, I understand that despite careful testing and screening of blood or blood products by collecting agencies, I may still be  subject to ill effects as a result of receiving a blood transfusion and/or blood products.  The following are some, but not all, of the potential risks that can occur: fever and allergic reactions, hemolytic reactions, transmission of diseases such as Hepatitis, AIDS and Cytomegalovirus (CMV) and fluid overload.  In the event that I wish to have an autologous transfusion of my own blood, or a directed donor transfusion, I will discuss this with my physician.  Check only if Refusing Blood or Blood Products  I understand refusal of blood or blood products as deemed necessary by my physician may have serious consequences to my condition to include possible death. I hereby assume responsibility for my refusal and release the hospital, its personnel, and my physicians from any responsibility for the consequences of my refusal.          o  Refuse      5.   I authorize the use of any specimen, organs, tissues, body parts or foreign objects that may be removed from my body during the operation/procedure for diagnosis, research or teaching purposes and their subsequent disposal by hospital authorities.  I also authorize the release of specimen test results and/or written reports to my treating physician on the hospital medical staff or other referring or consulting physicians involved in my care, at the discretion of the Pathologist or my treating physician.    6.   I consent to the photographing or videotaping of the operations or procedures to be performed, including appropriate portions of my body for medical, scientific, or educational purposes, provided my identity is not revealed by the pictures or by descriptive texts accompanying them.  If the procedure has been photographed/videotaped, the surgeon will obtain the original picture, image, videotape or CD.  The hospital will not be responsible for storage, release or maintenance of the picture, image, tape or CD.    7.   I consent to the presence of a product  specialist or observers in the operating room as deemed necessary by my physician or their designees.    8.   I recognize that in the event my procedure results in extended X-Ray/fluoroscopy time, I may develop a skin reaction.    9. If I have a Do Not Attempt Resuscitation (DNAR) order in place, that status will be suspended while in the operating room, procedural suite, and during the recovery period unless otherwise explicitly stated by me (or a person authorized to consent on my behalf). The surgeon or my attending physician will determine when the applicable recovery period ends for purposes of reinstating the DNAR order.  10. Patients having a sterilization procedure: I understand that if the procedure is successful the results will be permanent and it will therefore be impossible for me to inseminate, conceive, or bear children.  I also understand that the procedure is intended to result in sterility, although the result has not been guaranteed.   11. I acknowledge that my physician has explained sedation/analgesia administration to me including the risk and benefits I consent to the administration of sedation/analgesia as may be necessary or desirable in the judgment of my physician.    I CERTIFY THAT I HAVE READ AND FULLY UNDERSTAND THE ABOVE CONSENT TO OPERATION and/or OTHER PROCEDURE.    _________________________________________  __________________________________  Signature of Patient     Signature of Responsible Person         ___________________________________         Printed Name of Responsible Person           _________________________________                 Relationship to Patient  _________________________________________  ______________________________  Signature of Witness          Date  Time      Patient Name: Javi Padilla     : 1965                 Printed: May 30, 2024     Medical Record #: [id number]                     Page 1 of 2                                    [place]  [address]  [address]    Consent for Anesthesia    IJavi agree to be cared for by an anesthesiologist, who is specially trained to monitor me and give me medicine to put me to sleep or keep me comfortable during my procedure    I understand that my anesthesiologist is not an employee or agent of [facility] or [employer]. He or she works for [employer].    As the patient asking for anesthesia services, I agree to:  Allow the anesthesiologist (anesthesia doctor) to give me medicine and do additional procedures as necessary. Some examples are: Starting or using an “IV” to give me medicine, fluids or blood during my procedure, and having a breathing tube placed to help me breathe when I’m asleep (intubation). In the event that my heart stops working properly, I understand that my anesthesiologist will make every effort to sustain my life, unless otherwise directed by [facility] Do Not Resuscitate documents.  Tell my anesthesia doctor before my procedure:  If I am pregnant.  The last time that I ate or drank.  All of the medicines I take (including prescriptions, herbal supplements, and pills I can buy without a prescription (including street drugs/illegal medications). Failure to inform my anesthesiologist about these medicines may increase my risk of anesthetic complications.  If I am allergic to anything or have had a reaction to anesthesia before.  I understand how the anesthesia medicine will help me (benefits).  I understand that with any type of anesthesia medicine there are risks:  The most common risks are: nausea, vomiting, sore throat, muscle soreness, damage to my eyes, mouth, or teeth (from breathing tube placement).  Rare risks include: remembering what happened during my procedure, allergic reactions to medications, injury to my airway, heart, lungs, vision, nerves, or muscles and in extremely rare instances death.  My  doctor has explained to me other choices available to me for my care (alternatives).  Pregnant Patients (“epidural”):  I understand that the risks of having an epidural (medicine given into my back to help control pain during labor), include itching, low blood pressure, difficulty urinating, headache or slowing of the baby’s heart. Very rare risks include infection, bleeding, seizure, irregular heart rhythms and nerve injury.  Regional Anesthesia (“spinal”, “epidural”, & “nerve blocks”):  I understand that rare but potential complications include headache, bleeding, infection, seizure, irregular heart rhythms, and nerve injury.    I can change my mind about having anesthesia services at any time before I get the medicine.    _____________________________________________________________________________  Patient (or Representative) Signature/Relationship to Patient  Date   Time    _____________________________________________________________________________   Name (if used)    Language/Organization   Time    _____________________________________________________________________________  Anesthesiologist Signature     Date   Time  I have discussed the procedure and information above with the patient (or patient’s representative) and answered their questions. The patient or their representative has agreed to have anesthesia services.    _____________________________________________________________________________  Witness        Date   Time  I have verified that the signature is that of the patient or patient’s representative, and that it was signed before the procedure  Patient Name: Javi Padilla     : 1965                 Printed: May 30, 2024     Medical Record #: [id number]                     Page 2 of 2

## (undated) NOTE — LETTER
23    Patient: Bradford Mendoza  : 1965 Visit date: 2023    Dear  Manuel Jean-Baptiste MD    Thank you for referring Bradford Mendoza to my practice. Please find my assessment and plan below.        Assessment   Positive Cologuard and patient with positive family history of colon carcinoma, first colonoscopy  Sincerely,       Wendy Espinal DO   CC:   No Recipients